# Patient Record
Sex: MALE | Race: WHITE | Employment: FULL TIME | URBAN - METROPOLITAN AREA
[De-identification: names, ages, dates, MRNs, and addresses within clinical notes are randomized per-mention and may not be internally consistent; named-entity substitution may affect disease eponyms.]

---

## 2017-02-24 ENCOUNTER — OFFICE VISIT (OUTPATIENT)
Dept: FAMILY MEDICINE CLINIC | Age: 37
End: 2017-02-24

## 2017-02-24 VITALS
DIASTOLIC BLOOD PRESSURE: 80 MMHG | BODY MASS INDEX: 40.73 KG/M2 | OXYGEN SATURATION: 98 % | WEIGHT: 275 LBS | HEIGHT: 69 IN | SYSTOLIC BLOOD PRESSURE: 126 MMHG | HEART RATE: 66 BPM

## 2017-02-24 DIAGNOSIS — R73.01 ELEVATED FASTING GLUCOSE: ICD-10-CM

## 2017-02-24 DIAGNOSIS — E55.9 VITAMIN D INSUFFICIENCY: ICD-10-CM

## 2017-02-24 DIAGNOSIS — F41.9 ANXIETY: Primary | ICD-10-CM

## 2017-02-24 DIAGNOSIS — E78.1 HYPERTRIGLYCERIDEMIA: ICD-10-CM

## 2017-02-24 PROCEDURE — 99214 OFFICE O/P EST MOD 30 MIN: CPT | Performed by: FAMILY MEDICINE

## 2017-02-24 PROCEDURE — 36415 COLL VENOUS BLD VENIPUNCTURE: CPT | Performed by: FAMILY MEDICINE

## 2017-02-24 RX ORDER — ESCITALOPRAM OXALATE 10 MG/1
10 TABLET ORAL DAILY
Qty: 30 TABLET | Refills: 3 | Status: SHIPPED | OUTPATIENT
Start: 2017-02-24 | End: 2017-06-23 | Stop reason: SDUPTHER

## 2017-02-24 RX ORDER — FLUOXETINE HYDROCHLORIDE 40 MG/1
CAPSULE ORAL
Qty: 30 CAPSULE | Refills: 0 | Status: CANCELLED | OUTPATIENT
Start: 2017-02-24

## 2017-02-27 LAB
A/G RATIO: 1.7 (ref 1.1–2.2)
ALBUMIN SERPL-MCNC: 4.3 G/DL (ref 3.4–5)
ALP BLD-CCNC: 55 U/L (ref 40–129)
ALT SERPL-CCNC: 33 U/L (ref 10–40)
ANION GAP SERPL CALCULATED.3IONS-SCNC: 15 MMOL/L (ref 3–16)
AST SERPL-CCNC: 20 U/L (ref 15–37)
BILIRUB SERPL-MCNC: 0.8 MG/DL (ref 0–1)
BUN BLDV-MCNC: 14 MG/DL (ref 7–20)
CALCIUM SERPL-MCNC: 9.2 MG/DL (ref 8.3–10.6)
CHLORIDE BLD-SCNC: 100 MMOL/L (ref 99–110)
CHOLESTEROL, TOTAL: 160 MG/DL (ref 0–199)
CO2: 23 MMOL/L (ref 21–32)
CREAT SERPL-MCNC: 0.7 MG/DL (ref 0.9–1.3)
GFR AFRICAN AMERICAN: >60
GFR NON-AFRICAN AMERICAN: >60
GLOBULIN: 2.5 G/DL
GLUCOSE BLD-MCNC: 105 MG/DL (ref 70–99)
HDLC SERPL-MCNC: 27 MG/DL (ref 40–60)
LDL CHOLESTEROL CALCULATED: 78 MG/DL
POTASSIUM SERPL-SCNC: 4.3 MMOL/L (ref 3.5–5.1)
SODIUM BLD-SCNC: 138 MMOL/L (ref 136–145)
TOTAL PROTEIN: 6.8 G/DL (ref 6.4–8.2)
TRIGL SERPL-MCNC: 273 MG/DL (ref 0–150)
TSH REFLEX: 3.3 UIU/ML (ref 0.27–4.2)
VITAMIN D 25-HYDROXY: 33 NG/ML
VLDLC SERPL CALC-MCNC: 55 MG/DL

## 2017-02-28 LAB
ESTIMATED AVERAGE GLUCOSE: 91.1 MG/DL
HBA1C MFR BLD: 4.8 %

## 2017-06-23 RX ORDER — ESCITALOPRAM OXALATE 10 MG/1
TABLET ORAL
Qty: 30 TABLET | Refills: 3 | Status: SHIPPED | OUTPATIENT
Start: 2017-06-23 | End: 2017-11-02

## 2017-08-01 ENCOUNTER — OFFICE VISIT (OUTPATIENT)
Dept: FAMILY MEDICINE CLINIC | Age: 37
End: 2017-08-01

## 2017-08-01 VITALS
SYSTOLIC BLOOD PRESSURE: 130 MMHG | HEART RATE: 72 BPM | BODY MASS INDEX: 41.66 KG/M2 | OXYGEN SATURATION: 98 % | WEIGHT: 291 LBS | HEIGHT: 70 IN | DIASTOLIC BLOOD PRESSURE: 82 MMHG

## 2017-08-01 DIAGNOSIS — F41.9 ANXIETY: ICD-10-CM

## 2017-08-01 PROCEDURE — 99213 OFFICE O/P EST LOW 20 MIN: CPT | Performed by: NURSE PRACTITIONER

## 2017-08-01 RX ORDER — FLUOXETINE 20 MG/1
20 TABLET, FILM COATED ORAL DAILY
Qty: 30 TABLET | Refills: 3 | Status: SHIPPED | OUTPATIENT
Start: 2017-08-01 | End: 2017-11-02

## 2017-11-02 ENCOUNTER — OFFICE VISIT (OUTPATIENT)
Dept: FAMILY MEDICINE CLINIC | Age: 37
End: 2017-11-02

## 2017-11-02 VITALS
WEIGHT: 280 LBS | SYSTOLIC BLOOD PRESSURE: 120 MMHG | HEART RATE: 64 BPM | BODY MASS INDEX: 40.18 KG/M2 | OXYGEN SATURATION: 97 % | DIASTOLIC BLOOD PRESSURE: 80 MMHG

## 2017-11-02 DIAGNOSIS — H61.23 BILATERAL IMPACTED CERUMEN: Primary | ICD-10-CM

## 2017-11-02 DIAGNOSIS — H65.192 OTHER ACUTE NONSUPPURATIVE OTITIS MEDIA OF LEFT EAR, RECURRENCE NOT SPECIFIED: ICD-10-CM

## 2017-11-02 PROCEDURE — 99213 OFFICE O/P EST LOW 20 MIN: CPT | Performed by: FAMILY MEDICINE

## 2017-11-02 RX ORDER — OFLOXACIN 3 MG/ML
5 SOLUTION AURICULAR (OTIC) 2 TIMES DAILY
Qty: 10 ML | Refills: 0 | Status: SHIPPED | OUTPATIENT
Start: 2017-11-02 | End: 2017-11-12

## 2017-11-02 ASSESSMENT — PATIENT HEALTH QUESTIONNAIRE - PHQ9
2. FEELING DOWN, DEPRESSED OR HOPELESS: 0
SUM OF ALL RESPONSES TO PHQ QUESTIONS 1-9: 0
SUM OF ALL RESPONSES TO PHQ9 QUESTIONS 1 & 2: 0
1. LITTLE INTEREST OR PLEASURE IN DOING THINGS: 0

## 2017-11-07 NOTE — PROGRESS NOTES
Subjective:      Tony Walden is a 40 y.o. male who presents for evaluation of a plugged ear. He noticed the symptoms in the left ear, 6 weeks ago. Kev Boothe admits to ear pain. Patient's medications, allergies, past medical, surgical, social and family histories were reviewed and updated as appropriate. Review of Systems  Constitutional: negative for fevers      Objective:      /80 (Site: Right Arm, Position: Sitting, Cuff Size: Large Adult)   Pulse 64   Wt 280 lb (127 kg)   SpO2 97%   BMI 40.18 kg/m²   General: alert, appears stated age, cooperative and no distress   Right Ear: right canal ceruminous: cleaned with curette and ceruminous: irrigated   Left Ear:  left canal ceruminous: cleaned with curette, ceruminous: irrigated, red and inflamed   After removal: external auditory canal with inflammation/exudate on the left         Assessment:      Cerumen Impaction, with otitis externa. Plan:      Cerumen removed by flushing and currettage. Care instructions given. Home treatment: Floxin Otic. Follow up as needed.

## 2019-06-18 ENCOUNTER — OFFICE VISIT (OUTPATIENT)
Dept: FAMILY MEDICINE CLINIC | Age: 39
End: 2019-06-18
Payer: COMMERCIAL

## 2019-06-18 VITALS
BODY MASS INDEX: 41.52 KG/M2 | DIASTOLIC BLOOD PRESSURE: 80 MMHG | HEART RATE: 86 BPM | SYSTOLIC BLOOD PRESSURE: 132 MMHG | HEIGHT: 70 IN | WEIGHT: 290 LBS | OXYGEN SATURATION: 98 %

## 2019-06-18 DIAGNOSIS — Z13.31 POSITIVE DEPRESSION SCREENING: ICD-10-CM

## 2019-06-18 DIAGNOSIS — R00.2 PALPITATIONS: Primary | ICD-10-CM

## 2019-06-18 DIAGNOSIS — R06.83 SNORING: ICD-10-CM

## 2019-06-18 DIAGNOSIS — R53.83 FATIGUE, UNSPECIFIED TYPE: ICD-10-CM

## 2019-06-18 LAB
A/G RATIO: 1.9 (ref 1.1–2.2)
ALBUMIN SERPL-MCNC: 4.6 G/DL (ref 3.4–5)
ALP BLD-CCNC: 56 U/L (ref 40–129)
ALT SERPL-CCNC: 28 U/L (ref 10–40)
ANION GAP SERPL CALCULATED.3IONS-SCNC: 12 MMOL/L (ref 3–16)
AST SERPL-CCNC: 19 U/L (ref 15–37)
BASOPHILS ABSOLUTE: 0 K/UL (ref 0–0.2)
BASOPHILS RELATIVE PERCENT: 0.6 %
BILIRUB SERPL-MCNC: 0.7 MG/DL (ref 0–1)
BUN BLDV-MCNC: 17 MG/DL (ref 7–20)
CALCIUM SERPL-MCNC: 9.4 MG/DL (ref 8.3–10.6)
CHLORIDE BLD-SCNC: 107 MMOL/L (ref 99–110)
CHOLESTEROL, TOTAL: 155 MG/DL (ref 0–199)
CO2: 24 MMOL/L (ref 21–32)
CREAT SERPL-MCNC: 1 MG/DL (ref 0.9–1.3)
EOSINOPHILS ABSOLUTE: 0.2 K/UL (ref 0–0.6)
EOSINOPHILS RELATIVE PERCENT: 3.3 %
GFR AFRICAN AMERICAN: >60
GFR NON-AFRICAN AMERICAN: >60
GLOBULIN: 2.4 G/DL
GLUCOSE BLD-MCNC: 118 MG/DL (ref 70–99)
HCT VFR BLD CALC: 44.1 % (ref 40.5–52.5)
HDLC SERPL-MCNC: 25 MG/DL (ref 40–60)
HEMOGLOBIN: 15 G/DL (ref 13.5–17.5)
LDL CHOLESTEROL CALCULATED: ABNORMAL MG/DL
LDL CHOLESTEROL DIRECT: 78 MG/DL
LYMPHOCYTES ABSOLUTE: 1.6 K/UL (ref 1–5.1)
LYMPHOCYTES RELATIVE PERCENT: 24 %
MCH RBC QN AUTO: 30.3 PG (ref 26–34)
MCHC RBC AUTO-ENTMCNC: 34 G/DL (ref 31–36)
MCV RBC AUTO: 89.2 FL (ref 80–100)
MONOCYTES ABSOLUTE: 0.6 K/UL (ref 0–1.3)
MONOCYTES RELATIVE PERCENT: 9.1 %
NEUTROPHILS ABSOLUTE: 4.1 K/UL (ref 1.7–7.7)
NEUTROPHILS RELATIVE PERCENT: 63 %
PDW BLD-RTO: 13 % (ref 12.4–15.4)
PLATELET # BLD: 175 K/UL (ref 135–450)
PMV BLD AUTO: 8.9 FL (ref 5–10.5)
POTASSIUM SERPL-SCNC: 4.4 MMOL/L (ref 3.5–5.1)
RBC # BLD: 4.95 M/UL (ref 4.2–5.9)
SODIUM BLD-SCNC: 143 MMOL/L (ref 136–145)
T4 FREE: 1.2 NG/DL (ref 0.9–1.8)
TOTAL PROTEIN: 7 G/DL (ref 6.4–8.2)
TRIGL SERPL-MCNC: 353 MG/DL (ref 0–150)
TSH REFLEX: 5.13 UIU/ML (ref 0.27–4.2)
VITAMIN B-12: 680 PG/ML (ref 211–911)
VITAMIN D 25-HYDROXY: 29.5 NG/ML
VLDLC SERPL CALC-MCNC: ABNORMAL MG/DL
WBC # BLD: 6.5 K/UL (ref 4–11)

## 2019-06-18 PROCEDURE — 99214 OFFICE O/P EST MOD 30 MIN: CPT | Performed by: FAMILY MEDICINE

## 2019-06-18 PROCEDURE — G0444 DEPRESSION SCREEN ANNUAL: HCPCS | Performed by: FAMILY MEDICINE

## 2019-06-18 PROCEDURE — G8431 POS CLIN DEPRES SCRN F/U DOC: HCPCS | Performed by: FAMILY MEDICINE

## 2019-06-18 PROCEDURE — 93000 ELECTROCARDIOGRAM COMPLETE: CPT | Performed by: FAMILY MEDICINE

## 2019-06-18 ASSESSMENT — ENCOUNTER SYMPTOMS
SHORTNESS OF BREATH: 0
NAUSEA: 0

## 2019-06-18 ASSESSMENT — PATIENT HEALTH QUESTIONNAIRE - PHQ9
8. MOVING OR SPEAKING SO SLOWLY THAT OTHER PEOPLE COULD HAVE NOTICED. OR THE OPPOSITE, BEING SO FIGETY OR RESTLESS THAT YOU HAVE BEEN MOVING AROUND A LOT MORE THAN USUAL: 0
4. FEELING TIRED OR HAVING LITTLE ENERGY: 3
5. POOR APPETITE OR OVEREATING: 2
6. FEELING BAD ABOUT YOURSELF - OR THAT YOU ARE A FAILURE OR HAVE LET YOURSELF OR YOUR FAMILY DOWN: 3
SUM OF ALL RESPONSES TO PHQ QUESTIONS 1-9: 13
2. FEELING DOWN, DEPRESSED OR HOPELESS: 2
SUM OF ALL RESPONSES TO PHQ9 QUESTIONS 1 & 2: 2
9. THOUGHTS THAT YOU WOULD BE BETTER OFF DEAD, OR OF HURTING YOURSELF: 0
1. LITTLE INTEREST OR PLEASURE IN DOING THINGS: 0
10. IF YOU CHECKED OFF ANY PROBLEMS, HOW DIFFICULT HAVE THESE PROBLEMS MADE IT FOR YOU TO DO YOUR WORK, TAKE CARE OF THINGS AT HOME, OR GET ALONG WITH OTHER PEOPLE: 1
3. TROUBLE FALLING OR STAYING ASLEEP: 3
7. TROUBLE CONCENTRATING ON THINGS, SUCH AS READING THE NEWSPAPER OR WATCHING TELEVISION: 0
SUM OF ALL RESPONSES TO PHQ QUESTIONS 1-9: 13

## 2019-06-18 NOTE — PROGRESS NOTES
2019     Sharmin Leonardo (:  1980) is a 44 y.o. male, here for evaluation of the following medical concerns:    Chief complaint: Patient presents with:  Fatigue  Tachycardia: rapid heart rate after eating sugar  Extremity Weakness     Past medical, surgical, family and social history, as well as current medications and allergies, were reviewed and updated with the patient. Fatigue   This is a new problem. The current episode started more than 1 month ago (x 3 months, just not feeling like himself). The problem occurs constantly. The problem has been rapidly worsening. Associated symptoms include fatigue and weakness (feels like muscles are weak all over, feels like they get tired easily). Pertinent negatives include no chest pain or nausea. The symptoms are aggravated by exertion. He has tried rest for the symptoms. The treatment provided no relief. Palpitations    This is a new problem. Episode onset: x 3 months. The problem occurs intermittently. The problem has been unchanged. Associated symptoms include anxiety, an irregular heartbeat, malaise/fatigue and weakness (feels like muscles are weak all over, feels like they get tired easily). Pertinent negatives include no chest fullness, chest pain, dizziness, nausea, near-syncope or shortness of breath. Risk factors include obesity. Patient complains of a 20 year(s) history of non-restful sleep. Associated symptoms include: daytime fatigue, snoring, witnessed apneic episodes, anhedonia and depressed mood. Prior history of similar symptoms: yes - has never had a work up. He has a history of  depression and anxiety disorder. Patient has not attempted self treatment. .      Review of Systems   Constitutional: Positive for fatigue and malaise/fatigue. Respiratory: Negative for shortness of breath. Cardiovascular: Positive for palpitations. Negative for chest pain and near-syncope. Gastrointestinal: Negative for nausea.    Neurological: Positive for weakness (feels like muscles are weak all over, feels like they get tired easily). Negative for dizziness. Psychiatric/Behavioral: The patient is nervous/anxious. Prior to Visit Medications    Not on File        Social History     Tobacco Use    Smoking status: Never Smoker    Smokeless tobacco: Never Used   Substance Use Topics    Alcohol use: Yes     Comment: emil        Vitals:    06/18/19 0804   BP: 132/80   Site: Right Upper Arm   Position: Sitting   Cuff Size: Large Adult   Pulse: 86   SpO2: 98%   Weight: 290 lb (131.5 kg)   Height: 5' 10\" (1.778 m)     Estimated body mass index is 41.61 kg/m² as calculated from the following:    Height as of this encounter: 5' 10\" (1.778 m). Weight as of this encounter: 290 lb (131.5 kg). Physical Exam   Constitutional: He appears well-developed and well-nourished. No distress. HENT:   Head: Normocephalic and atraumatic. Eyes: Conjunctivae are normal. No scleral icterus. Cardiovascular: Normal rate, regular rhythm and normal heart sounds. Exam reveals no gallop and no friction rub. No murmur heard. Pulmonary/Chest: Effort normal and breath sounds normal. No respiratory distress. He has no wheezes. He has no rales. Abdominal: Soft. Bowel sounds are normal. He exhibits no distension and no mass. There is no tenderness. There is no rebound and no guarding. Musculoskeletal: He exhibits no edema. Neurological: He is alert. Skin: He is not diaphoretic. Nursing note and vitals reviewed. ASSESSMENT/PLAN:  1. Palpitations  - EKG 12 Lead  - CBC Auto Differential  - Comprehensive Metabolic Panel  - TSH with Reflex  - Lipid Panel  - Hemoglobin A1C  - Angelica Obrien MD, Sleep Medicine, [facility]    2. Fatigue, unspecified type  Discussed diagnosis with patient. Reassured that serious underlying cause for the fatigue is very unlikely. Discussed lifestyle modification as means of resolving problem.   Discussed how depression can be a cause of fatigue.     - EKG 12 Lead  - CBC Auto Differential  - Comprehensive Metabolic Panel  - TSH with Reflex  - Lipid Panel  - Hemoglobin A1C  - Vitamin D 25 Hydroxy  - Vitamin B12  - [facility]    3. Snoring  Likely needs PSG.   - Adelaida Schaumann, [facility], [facility]      No follow-ups on file. An electronic signature was used to authenticate this note. --Bethanie Reeder MD on 6/18/2019 at 1:33 PM  On the basis of positive PHQ-9 screening (PHQ-9 Total Score: 13), the following plan was implemented: medication recommended.

## 2019-06-19 LAB
ESTIMATED AVERAGE GLUCOSE: 102.5 MG/DL
HBA1C MFR BLD: 5.2 %

## 2019-06-23 DIAGNOSIS — R79.89 ELEVATED TSH: Primary | ICD-10-CM

## 2019-11-12 ENCOUNTER — OFFICE VISIT (OUTPATIENT)
Dept: PULMONOLOGY | Age: 39
End: 2019-11-12
Payer: COMMERCIAL

## 2019-11-12 VITALS
TEMPERATURE: 98.7 F | DIASTOLIC BLOOD PRESSURE: 88 MMHG | RESPIRATION RATE: 20 BRPM | HEIGHT: 70 IN | BODY MASS INDEX: 43.75 KG/M2 | WEIGHT: 305.6 LBS | OXYGEN SATURATION: 95 % | SYSTOLIC BLOOD PRESSURE: 139 MMHG | HEART RATE: 79 BPM

## 2019-11-12 DIAGNOSIS — G47.10 EXCESSIVE SOMNOLENCE DISORDER: Primary | ICD-10-CM

## 2019-11-12 PROCEDURE — 99243 OFF/OP CNSLTJ NEW/EST LOW 30: CPT | Performed by: INTERNAL MEDICINE

## 2019-11-12 ASSESSMENT — SLEEP AND FATIGUE QUESTIONNAIRES
HOW LIKELY ARE YOU TO NOD OFF OR FALL ASLEEP WHILE SITTING INACTIVE IN A PUBLIC PLACE: 1
HOW LIKELY ARE YOU TO NOD OFF OR FALL ASLEEP WHILE SITTING AND READING: 2
HOW LIKELY ARE YOU TO NOD OFF OR FALL ASLEEP WHILE SITTING QUIETLY AFTER LUNCH WITHOUT ALCOHOL: 1
HOW LIKELY ARE YOU TO NOD OFF OR FALL ASLEEP WHILE LYING DOWN TO REST IN THE AFTERNOON WHEN CIRCUMSTANCES PERMIT: 3
DO YOU HAVE CONGESTIVE HEART FAILURE (CHF), HYPERTENSION, CORONARY DISEASE, CARDIAC ARRYTHMIAS, DIABETES, OR HAD A STROKE: 0
HOW LIKELY ARE YOU TO NOD OFF OR FALL ASLEEP IN A CAR, WHILE STOPPED FOR A FEW MINUTES IN TRAFFIC: 0
HOW LIKELY ARE YOU TO NOD OFF OR FALL ASLEEP WHILE SITTING AND TALKING TO SOMEONE: 0
HAVE YOU BEEN TOLD YOU SNORE, EVEN INTERMITTENTLY: 1
HOW LIKELY ARE YOU TO NOD OFF OR FALL ASLEEP WHEN YOU ARE A PASSENGER IN A CAR FOR AN HOUR WITHOUT A BREAK: 2
HAVE YOU EVER BEEN TOLD YOU STOP BREATHING OR HOLD YOUR BREATH WHILE SLEEPING: 3
DO YOU WAKE UP MORE THAN ONCE A NIGHT TO URINATE: 0
ESS TOTAL SCORE: 10
HOW LIKELY ARE YOU TO NOD OFF OR FALL ASLEEP WHILE WATCHING TV: 1
DO YOU WAKE UP TIRED, ARE YOU TIRED DURING THE DAY, OR DO YOU TAKE NAPS: 1
NECK CIRCUMFERENCE (INCHES): 16
SLEEP APNEA SCREENER SCORE: 6

## 2019-11-12 ASSESSMENT — ENCOUNTER SYMPTOMS
EYE DISCHARGE: 0
EYE PAIN: 0
CONSTIPATION: 0
EYE ITCHING: 0
CHOKING: 0
STRIDOR: 0
DIARRHEA: 0
ABDOMINAL PAIN: 0
VOICE CHANGE: 0
SORE THROAT: 0
CHEST TIGHTNESS: 0

## 2020-01-06 ENCOUNTER — HOSPITAL ENCOUNTER (OUTPATIENT)
Dept: SLEEP CENTER | Age: 40
Discharge: HOME | End: 2020-01-08
Payer: COMMERCIAL

## 2020-01-06 PROCEDURE — 95806 SLEEP STUDY UNATT&RESP EFFT: CPT

## 2020-01-09 ENCOUNTER — OFFICE VISIT (OUTPATIENT)
Dept: PULMONOLOGY | Age: 40
End: 2020-01-09
Payer: COMMERCIAL

## 2020-01-09 VITALS
TEMPERATURE: 98.4 F | BODY MASS INDEX: 43.03 KG/M2 | DIASTOLIC BLOOD PRESSURE: 92 MMHG | HEART RATE: 59 BPM | HEIGHT: 70 IN | SYSTOLIC BLOOD PRESSURE: 155 MMHG | WEIGHT: 300.6 LBS | OXYGEN SATURATION: 99 % | RESPIRATION RATE: 18 BRPM

## 2020-01-09 PROCEDURE — 99214 OFFICE O/P EST MOD 30 MIN: CPT | Performed by: INTERNAL MEDICINE

## 2020-01-09 ASSESSMENT — ENCOUNTER SYMPTOMS
CONSTIPATION: 0
CHEST TIGHTNESS: 0
STRIDOR: 0
EYE DISCHARGE: 0
CHOKING: 0
SORE THROAT: 0
DIARRHEA: 0
VOICE CHANGE: 0
ABDOMINAL PAIN: 0
EYE ITCHING: 0
EYE PAIN: 0

## 2020-01-09 NOTE — PATIENT INSTRUCTIONS
Remember to bring all pulmonary medications to your next appointment with the office. Please keep all of your future appointments scheduled by [address], [address] [facility] office. Out of respect for other patients and providers, you may be asked to reschedule your appointment if you arrive later than your scheduled appointment time. Appointments cancelled less than 24hrs in advance will be considered a no show. Patients with three missed appointments within 1 year or four missed appointments within 2 years can be dismissed from the practice. You may receive a survey regarding the care you received during your visit. Your input is valuable to us. We encourage you to complete and return your survey. We hope you will choose us in the future for your healthcare needs.

## 2020-01-09 NOTE — PROGRESS NOTES
Pulmonary Outpatient Note   Shanice Toscano MD       1/9/2020    Chief Complaint:  Follow-up (HST)     HPI:   44y.o. year old male here for follow up of EDDA. AHI of 30 on sleep study confirming severe EDDA  Unchanged symptoms from last visit     Past Medical History:   Diagnosis Date    Anxiety     Irritable bowel syndrome     Metabolic syndrome        Past Surgical History:   Procedure Laterality Date    APPENDECTOMY  1989       Social History     Tobacco Use    Smoking status: Never Smoker    Smokeless tobacco: Never Used   Substance Use Topics    Alcohol use: Yes     Comment: socially          Family History   Problem Relation Age of Onset    Cancer Maternal Grandmother         colon    Heart Disease Paternal Grandfather        No current outpatient medications on file. Patient has no known allergies. Vitals:    01/09/20 0833   BP: (!) 155/92   Pulse: 59   Resp: 18   Temp: 98.4 °F (36.9 °C)   TempSrc: Oral   SpO2: 99%   Weight: (!) 300 lb 9.6 oz (136.4 kg)   Height: 5' 10\" (1.778 m)       Review of Systems   Constitutional: Negative for chills, fever and unexpected weight change. HENT: Negative for mouth sores, sore throat and voice change. Eyes: Negative for pain, discharge and itching. Respiratory: Negative for choking, chest tightness and stridor. Cardiovascular: Negative for chest pain, palpitations and leg swelling. Gastrointestinal: Negative for abdominal pain, constipation and diarrhea. Endocrine: Negative for cold intolerance, heat intolerance and polydipsia. Genitourinary: Negative for dysuria, frequency and hematuria. Musculoskeletal: Negative for gait problem, joint swelling and neck stiffness. Neurological: Negative for dizziness, numbness and headaches. Psychiatric/Behavioral: Negative for agitation, confusion and hallucinations. Physical Exam  Constitutional:       General: He is not in acute distress. Appearance: He is well-developed.  He is not diaphoretic. HENT:      Head: Normocephalic and atraumatic. Mouth/Throat:      Pharynx: No oropharyngeal exudate. Eyes:      Pupils: Pupils are equal, round, and reactive to light. Neck:      Musculoskeletal: Neck supple. Vascular: No JVD. Cardiovascular:      Heart sounds: Normal heart sounds. No murmur. No friction rub. No gallop. Pulmonary:      Effort: Pulmonary effort is normal.      Breath sounds: No wheezing or rales. Abdominal:      General: Bowel sounds are normal. There is no distension. Palpations: Abdomen is soft. Tenderness: There is no tenderness. Musculoskeletal: Normal range of motion. Lymphadenopathy:      Cervical: No cervical adenopathy. Skin:     General: Skin is warm and dry. Findings: No rash. Neurological:      Mental Status: He is alert. Cranial Nerves: No cranial nerve deficit.       Comments: CN 2-12 grossly intact         ASSESSMENT:    1. EDDA (obstructive sleep apnea)      PLAN:    -Discussed new diagnosis of severe EDDA and treatment options  -He agreed to proceed with PAP therapy, will set up with this along with mask fitting through DME  -Counseled on sleep hygiene and weight management  -Return to clinic in 31 days     Orders Placed This Encounter   Procedures    CPAP    CPAP    CPAP    CPAP    CPAP    CPAP continuous - nasal mask    CPAP       Moreno Verma MD

## 2020-03-31 ENCOUNTER — VIRTUAL VISIT (OUTPATIENT)
Dept: PULMONOLOGY | Age: 40
End: 2020-03-31
Payer: COMMERCIAL

## 2020-03-31 ENCOUNTER — TELEPHONE (OUTPATIENT)
Dept: PULMONOLOGY | Age: 40
End: 2020-03-31

## 2020-03-31 PROCEDURE — 99213 OFFICE O/P EST LOW 20 MIN: CPT | Performed by: INTERNAL MEDICINE

## 2020-03-31 ASSESSMENT — ENCOUNTER SYMPTOMS
STRIDOR: 0
CHOKING: 0
SORE THROAT: 0
CHEST TIGHTNESS: 0
EYE DISCHARGE: 0
CONSTIPATION: 0
DIARRHEA: 0
VOICE CHANGE: 0
EYE PAIN: 0
ABDOMINAL PAIN: 0
EYE ITCHING: 0

## 2020-03-31 NOTE — Clinical Note
Needs follow up for EDDA in 6 months. His machine is changed to pressure of 7-18, I placed order to notify DME.  Nothing needs to be done

## 2020-04-08 NOTE — TELEPHONE ENCOUNTER
Patient has not returned multiple calls to schedule a follow up appointment in 6 months. We will wait for patient to contact our office.

## 2021-06-17 ENCOUNTER — OFFICE VISIT (OUTPATIENT)
Dept: FAMILY MEDICINE CLINIC | Age: 41
End: 2021-06-17
Payer: COMMERCIAL

## 2021-06-17 VITALS
WEIGHT: 284.2 LBS | HEIGHT: 70 IN | DIASTOLIC BLOOD PRESSURE: 80 MMHG | OXYGEN SATURATION: 97 % | HEART RATE: 55 BPM | BODY MASS INDEX: 40.69 KG/M2 | SYSTOLIC BLOOD PRESSURE: 126 MMHG

## 2021-06-17 DIAGNOSIS — E88.81 METABOLIC SYNDROME: ICD-10-CM

## 2021-06-17 DIAGNOSIS — E78.2 MIXED HYPERLIPIDEMIA: ICD-10-CM

## 2021-06-17 DIAGNOSIS — Z11.59 NEED FOR HEPATITIS C SCREENING TEST: ICD-10-CM

## 2021-06-17 DIAGNOSIS — R43.2 LOSS OF TASTE: ICD-10-CM

## 2021-06-17 DIAGNOSIS — R43.0 LOSS OF SMELL: ICD-10-CM

## 2021-06-17 DIAGNOSIS — N52.9 ERECTILE DYSFUNCTION, UNSPECIFIED ERECTILE DYSFUNCTION TYPE: ICD-10-CM

## 2021-06-17 DIAGNOSIS — E55.9 VITAMIN D INSUFFICIENCY: ICD-10-CM

## 2021-06-17 DIAGNOSIS — R79.89 ABNORMAL TSH: Primary | ICD-10-CM

## 2021-06-17 DIAGNOSIS — E66.01 MORBID OBESITY WITH BMI OF 40.0-44.9, ADULT (HCC): ICD-10-CM

## 2021-06-17 LAB
A/G RATIO: 2 (ref 1.1–2.2)
ALBUMIN SERPL-MCNC: 4.8 G/DL (ref 3.4–5)
ALP BLD-CCNC: 64 U/L (ref 40–129)
ALT SERPL-CCNC: 29 U/L (ref 10–40)
ANION GAP SERPL CALCULATED.3IONS-SCNC: 12 MMOL/L (ref 3–16)
AST SERPL-CCNC: 23 U/L (ref 15–37)
BASOPHILS ABSOLUTE: 0 K/UL (ref 0–0.2)
BASOPHILS RELATIVE PERCENT: 0.3 %
BILIRUB SERPL-MCNC: 1.4 MG/DL (ref 0–1)
BUN BLDV-MCNC: 18 MG/DL (ref 7–20)
CALCIUM SERPL-MCNC: 9.7 MG/DL (ref 8.3–10.6)
CHLORIDE BLD-SCNC: 103 MMOL/L (ref 99–110)
CHOLESTEROL, TOTAL: 165 MG/DL (ref 0–199)
CO2: 25 MMOL/L (ref 21–32)
CREAT SERPL-MCNC: 1.1 MG/DL (ref 0.9–1.3)
EOSINOPHILS ABSOLUTE: 0.1 K/UL (ref 0–0.6)
EOSINOPHILS RELATIVE PERCENT: 1.4 %
GFR AFRICAN AMERICAN: >60
GFR NON-AFRICAN AMERICAN: >60
GLOBULIN: 2.4 G/DL
GLUCOSE BLD-MCNC: 87 MG/DL (ref 70–99)
HCT VFR BLD CALC: 44.1 % (ref 40.5–52.5)
HDLC SERPL-MCNC: 29 MG/DL (ref 40–60)
HEMOGLOBIN: 15.4 G/DL (ref 13.5–17.5)
HEPATITIS C ANTIBODY INTERPRETATION: NORMAL
LDL CHOLESTEROL CALCULATED: 112 MG/DL
LYMPHOCYTES ABSOLUTE: 1.4 K/UL (ref 1–5.1)
LYMPHOCYTES RELATIVE PERCENT: 26.1 %
MCH RBC QN AUTO: 30.5 PG (ref 26–34)
MCHC RBC AUTO-ENTMCNC: 34.9 G/DL (ref 31–36)
MCV RBC AUTO: 87.3 FL (ref 80–100)
MONOCYTES ABSOLUTE: 0.5 K/UL (ref 0–1.3)
MONOCYTES RELATIVE PERCENT: 8.8 %
NEUTROPHILS ABSOLUTE: 3.5 K/UL (ref 1.7–7.7)
NEUTROPHILS RELATIVE PERCENT: 63.4 %
PDW BLD-RTO: 12.9 % (ref 12.4–15.4)
PLATELET # BLD: 155 K/UL (ref 135–450)
PMV BLD AUTO: 9 FL (ref 5–10.5)
POTASSIUM SERPL-SCNC: 4.1 MMOL/L (ref 3.5–5.1)
RBC # BLD: 5.05 M/UL (ref 4.2–5.9)
SARS-COV-2 ANTIBODY, TOTAL: POSITIVE
SODIUM BLD-SCNC: 140 MMOL/L (ref 136–145)
T4 FREE: 1.3 NG/DL (ref 0.9–1.8)
TOTAL PROTEIN: 7.2 G/DL (ref 6.4–8.2)
TRIGL SERPL-MCNC: 121 MG/DL (ref 0–150)
TSH REFLEX: 3.68 UIU/ML (ref 0.27–4.2)
VITAMIN D 25-HYDROXY: 31.1 NG/ML
VLDLC SERPL CALC-MCNC: 24 MG/DL
WBC # BLD: 5.5 K/UL (ref 4–11)

## 2021-06-17 PROCEDURE — 99214 OFFICE O/P EST MOD 30 MIN: CPT | Performed by: FAMILY MEDICINE

## 2021-06-17 SDOH — ECONOMIC STABILITY: INCOME INSECURITY: IN THE LAST 12 MONTHS, WAS THERE A TIME WHEN YOU WERE NOT ABLE TO PAY THE MORTGAGE OR RENT ON TIME?: NO

## 2021-06-17 SDOH — ECONOMIC STABILITY: FOOD INSECURITY: WITHIN THE PAST 12 MONTHS, YOU WORRIED THAT YOUR FOOD WOULD RUN OUT BEFORE YOU GOT MONEY TO BUY MORE.: NEVER TRUE

## 2021-06-17 SDOH — ECONOMIC STABILITY: FOOD INSECURITY: WITHIN THE PAST 12 MONTHS, THE FOOD YOU BOUGHT JUST DIDN'T LAST AND YOU DIDN'T HAVE MONEY TO GET MORE.: NEVER TRUE

## 2021-06-17 SDOH — ECONOMIC STABILITY: TRANSPORTATION INSECURITY
IN THE PAST 12 MONTHS, HAS THE LACK OF TRANSPORTATION KEPT YOU FROM MEDICAL APPOINTMENTS OR FROM GETTING MEDICATIONS?: NO

## 2021-06-17 SDOH — ECONOMIC STABILITY: HOUSING INSECURITY
IN THE LAST 12 MONTHS, WAS THERE A TIME WHEN YOU DID NOT HAVE A STEADY PLACE TO SLEEP OR SLEPT IN A SHELTER (INCLUDING NOW)?: NO

## 2021-06-17 SDOH — ECONOMIC STABILITY: TRANSPORTATION INSECURITY
IN THE PAST 12 MONTHS, HAS LACK OF TRANSPORTATION KEPT YOU FROM MEETINGS, WORK, OR FROM GETTING THINGS NEEDED FOR DAILY LIVING?: NO

## 2021-06-17 ASSESSMENT — PATIENT HEALTH QUESTIONNAIRE - PHQ9
SUM OF ALL RESPONSES TO PHQ9 QUESTIONS 1 & 2: 0
1. LITTLE INTEREST OR PLEASURE IN DOING THINGS: 0
SUM OF ALL RESPONSES TO PHQ QUESTIONS 1-9: 0
SUM OF ALL RESPONSES TO PHQ QUESTIONS 1-9: 0
2. FEELING DOWN, DEPRESSED OR HOPELESS: 0
SUM OF ALL RESPONSES TO PHQ QUESTIONS 1-9: 0

## 2021-06-17 ASSESSMENT — SOCIAL DETERMINANTS OF HEALTH (SDOH): HOW HARD IS IT FOR YOU TO PAY FOR THE VERY BASICS LIKE FOOD, HOUSING, MEDICAL CARE, AND HEATING?: NOT HARD AT ALL

## 2021-06-17 NOTE — PROGRESS NOTES
Gloria Giles (:  1980) is a 39 y.o. male,Established patient, here for evaluation of the following chief complaint(s):  Thyroid Problem (would like thyroid checked. didnt come back after covid started), Discuss Labs (would like testosterone testing ), and Hyperlipidemia         ASSESSMENT/PLAN:  1. Abnormal TSH  -     TSH with Reflex  -     T4, Free  2. Morbid obesity with BMI of 40.0-44.9, adult (HCC)  -     TSH with Reflex  -     T4, Free  -     CBC Auto Differential  -     Comprehensive Metabolic Panel  -     Lipid Panel  -     Hemoglobin A1C  Improving with diet change and exercise. Encouraged to continue. 3. Metabolic syndrome  -     CBC Auto Differential  -     Comprehensive Metabolic Panel  -     Lipid Panel  -     Hemoglobin A1C  4. Mixed hyperlipidemia  -     TSH with Reflex  -     T4, Free  -     CBC Auto Differential  -     Comprehensive Metabolic Panel  -     Lipid Panel  -     Hemoglobin A1C  5. Erectile dysfunction, unspecified erectile dysfunction type  -     Testosterone  6. Loss of taste  -     Covid-19, Antibody, Total  7. Loss of smell  -     Covid-19, Antibody, Total  8. Need for hepatitis C screening test  -     Hepatitis C Antibody  9. Vitamin D insufficiency  -     Vitamin D 25 Hydroxy      Return in about 1 year (around 2022) for Annual physical.         Subjective   SUBJECTIVE/OBJECTIVE:  HPI   Patient is here for follow up on an abnormal TSH test 2 years ago. It was recommended he have the test repeated in 2-4 weeks, but he didn't view the message until a year later, and was unable to come in at that time due to COVID-19 pandemic. Here to follow up on elevated triglycerides and low HDL. He states he has been working on his overall health. He has been counting calories, walking, losing weight. Has had a 3 week plateau. Started running again last week. He would like to have testosterone checked.  He has some intermittent erectile issues, and would like to have this checked. He had an infection with loss of taste and smell. He did not have a COVID test at the time, and would like to know if he has antibodies. Hyperlipidemia:   He is  exercising regularly. OTC Supplements: none. Lab Results   Component Value Date    TRIG 121 06/17/2021    HDL 29 06/17/2021    HDL 26 06/30/2011    LDLCALC 112 06/17/2021    LDLDIRECT 78 06/18/2019     Lab Results   Component Value Date    ALT 29 06/17/2021    AST 23 06/17/2021           Review of Systems       Objective   Physical Exam  Vitals and nursing note reviewed. Constitutional:       General: He is not in acute distress. Appearance: He is well-developed. He is not diaphoretic. HENT:      Head: Normocephalic and atraumatic. Eyes:      General: No scleral icterus. Conjunctiva/sclera: Conjunctivae normal.   Cardiovascular:      Rate and Rhythm: Normal rate and regular rhythm. Heart sounds: Normal heart sounds. No murmur heard. No friction rub. No gallop. Pulmonary:      Effort: Pulmonary effort is normal. No respiratory distress. Breath sounds: Normal breath sounds. No wheezing or rales. Abdominal:      General: Bowel sounds are normal. There is no distension. Palpations: Abdomen is soft. There is no mass. Tenderness: There is no abdominal tenderness. There is no guarding or rebound. Neurological:      Mental Status: He is alert. Lab Review   No visits with results within 6 Month(s) from this visit.    Latest known visit with results is:   Office Visit on 06/18/2019   Component Date Value    WBC 06/18/2019 6.5     RBC 06/18/2019 4.95     Hemoglobin 06/18/2019 15.0     Hematocrit 06/18/2019 44.1     MCV 06/18/2019 89.2     MCH 06/18/2019 30.3     MCHC 06/18/2019 34.0     RDW 06/18/2019 13.0     Platelets 14/89/8479 175     MPV 06/18/2019 8.9     Neutrophils % 06/18/2019 63.0     Lymphocytes % 06/18/2019 24.0     Monocytes % 06/18/2019 9.1     Eosinophils % 06/18/2019 3.3     Basophils % 06/18/2019 0.6     Neutrophils Absolute 06/18/2019 4.1     Lymphocytes Absolute 06/18/2019 1.6     Monocytes Absolute 06/18/2019 0.6     Eosinophils Absolute 06/18/2019 0.2     Basophils Absolute 06/18/2019 0.0     Sodium 06/18/2019 143     Potassium 06/18/2019 4.4     Chloride 06/18/2019 107     CO2 06/18/2019 24     Anion Gap 06/18/2019 12     Glucose 06/18/2019 118*    BUN 06/18/2019 17     CREATININE 06/18/2019 1.0     GFR Non- 06/18/2019 >60     GFR  06/18/2019 >60     Calcium 06/18/2019 9.4     Total Protein 06/18/2019 7.0     Albumin 06/18/2019 4.6     Albumin/Globulin Ratio 06/18/2019 1.9     Total Bilirubin 06/18/2019 0.7     Alkaline Phosphatase 06/18/2019 56     ALT 06/18/2019 28     AST 06/18/2019 19     Globulin 06/18/2019 2.4     TSH 06/18/2019 5.13*    Cholesterol, Total 06/18/2019 155     Triglycerides 06/18/2019 353*    HDL 06/18/2019 25*    LDL Calculated 06/18/2019 see below     VLDL Cholesterol Calcula* 06/18/2019 see below     Hemoglobin A1C 06/18/2019 5.2     eAG 06/18/2019 102.5     Vit D, 25-Hydroxy 06/18/2019 29.5*    Vitamin B-12 06/18/2019 680     LDL Direct 06/18/2019 78     T4 Free 06/18/2019 1.2          An electronic signature was used to authenticate this note.     --Jc Gaspar MD

## 2021-06-18 LAB
ESTIMATED AVERAGE GLUCOSE: 99.7 MG/DL
HBA1C MFR BLD: 5.1 %

## 2021-06-22 LAB — TESTOSTERONE TOTAL: 404 NG/DL (ref 220–1000)

## 2022-06-23 ENCOUNTER — OFFICE VISIT (OUTPATIENT)
Dept: FAMILY MEDICINE CLINIC | Age: 42
End: 2022-06-23
Payer: COMMERCIAL

## 2022-06-23 VITALS
OXYGEN SATURATION: 98 % | HEIGHT: 70 IN | SYSTOLIC BLOOD PRESSURE: 124 MMHG | WEIGHT: 282 LBS | BODY MASS INDEX: 40.37 KG/M2 | HEART RATE: 54 BPM | DIASTOLIC BLOOD PRESSURE: 82 MMHG

## 2022-06-23 DIAGNOSIS — Z00.00 ROUTINE GENERAL MEDICAL EXAMINATION AT A HEALTH CARE FACILITY: Primary | ICD-10-CM

## 2022-06-23 DIAGNOSIS — R41.840 ATTENTION AND CONCENTRATION DEFICIT: ICD-10-CM

## 2022-06-23 DIAGNOSIS — R73.09 ELEVATED GLUCOSE: Primary | ICD-10-CM

## 2022-06-23 DIAGNOSIS — R63.8 UNABLE TO LOSE WEIGHT: ICD-10-CM

## 2022-06-23 PROBLEM — G47.33 OSA ON CPAP: Status: ACTIVE | Noted: 2022-06-23

## 2022-06-23 PROBLEM — Z99.89 OSA ON CPAP: Status: ACTIVE | Noted: 2022-06-23

## 2022-06-23 LAB
A/G RATIO: 1.6 (ref 1.1–2.2)
ALBUMIN SERPL-MCNC: 4.2 G/DL (ref 3.4–5)
ALP BLD-CCNC: 68 U/L (ref 40–129)
ALT SERPL-CCNC: 26 U/L (ref 10–40)
ANION GAP SERPL CALCULATED.3IONS-SCNC: 13 MMOL/L (ref 3–16)
AST SERPL-CCNC: 19 U/L (ref 15–37)
BASOPHILS ABSOLUTE: 0.1 K/UL (ref 0–0.2)
BASOPHILS RELATIVE PERCENT: 1 %
BILIRUB SERPL-MCNC: 0.9 MG/DL (ref 0–1)
BUN BLDV-MCNC: 14 MG/DL (ref 7–20)
CALCIUM SERPL-MCNC: 9.4 MG/DL (ref 8.3–10.6)
CHLORIDE BLD-SCNC: 103 MMOL/L (ref 99–110)
CHOLESTEROL, TOTAL: 162 MG/DL (ref 0–199)
CO2: 23 MMOL/L (ref 21–32)
CORTISOL - AM: 5.6 UG/DL (ref 4.3–22.4)
CREAT SERPL-MCNC: 0.9 MG/DL (ref 0.9–1.3)
EOSINOPHILS ABSOLUTE: 0.1 K/UL (ref 0–0.6)
EOSINOPHILS RELATIVE PERCENT: 2.1 %
GFR AFRICAN AMERICAN: >60
GFR NON-AFRICAN AMERICAN: >60
GLUCOSE BLD-MCNC: 108 MG/DL (ref 70–99)
HCT VFR BLD CALC: 41.4 % (ref 40.5–52.5)
HDLC SERPL-MCNC: 30 MG/DL (ref 40–60)
HEMOGLOBIN: 14.5 G/DL (ref 13.5–17.5)
LDL CHOLESTEROL CALCULATED: 109 MG/DL
LYMPHOCYTES ABSOLUTE: 1.6 K/UL (ref 1–5.1)
LYMPHOCYTES RELATIVE PERCENT: 26.2 %
MCH RBC QN AUTO: 30.2 PG (ref 26–34)
MCHC RBC AUTO-ENTMCNC: 35 G/DL (ref 31–36)
MCV RBC AUTO: 86.3 FL (ref 80–100)
MONOCYTES ABSOLUTE: 0.4 K/UL (ref 0–1.3)
MONOCYTES RELATIVE PERCENT: 7.3 %
NEUTROPHILS ABSOLUTE: 3.9 K/UL (ref 1.7–7.7)
NEUTROPHILS RELATIVE PERCENT: 63.4 %
PDW BLD-RTO: 13.1 % (ref 12.4–15.4)
PLATELET # BLD: 153 K/UL (ref 135–450)
PMV BLD AUTO: 8.9 FL (ref 5–10.5)
POTASSIUM SERPL-SCNC: 4.3 MMOL/L (ref 3.5–5.1)
RBC # BLD: 4.8 M/UL (ref 4.2–5.9)
SODIUM BLD-SCNC: 139 MMOL/L (ref 136–145)
TOTAL PROTEIN: 6.8 G/DL (ref 6.4–8.2)
TRIGL SERPL-MCNC: 113 MG/DL (ref 0–150)
TSH REFLEX: 2.76 UIU/ML (ref 0.27–4.2)
VLDLC SERPL CALC-MCNC: 23 MG/DL
WBC # BLD: 6.1 K/UL (ref 4–11)

## 2022-06-23 PROCEDURE — 99396 PREV VISIT EST AGE 40-64: CPT | Performed by: FAMILY MEDICINE

## 2022-06-23 SDOH — ECONOMIC STABILITY: FOOD INSECURITY: WITHIN THE PAST 12 MONTHS, THE FOOD YOU BOUGHT JUST DIDN'T LAST AND YOU DIDN'T HAVE MONEY TO GET MORE.: NEVER TRUE

## 2022-06-23 SDOH — ECONOMIC STABILITY: FOOD INSECURITY: WITHIN THE PAST 12 MONTHS, YOU WORRIED THAT YOUR FOOD WOULD RUN OUT BEFORE YOU GOT MONEY TO BUY MORE.: NEVER TRUE

## 2022-06-23 ASSESSMENT — PATIENT HEALTH QUESTIONNAIRE - PHQ9
1. LITTLE INTEREST OR PLEASURE IN DOING THINGS: 0
SUM OF ALL RESPONSES TO PHQ9 QUESTIONS 1 & 2: 0
SUM OF ALL RESPONSES TO PHQ QUESTIONS 1-9: 0
2. FEELING DOWN, DEPRESSED OR HOPELESS: 0
SUM OF ALL RESPONSES TO PHQ QUESTIONS 1-9: 0

## 2022-06-23 ASSESSMENT — SOCIAL DETERMINANTS OF HEALTH (SDOH): HOW HARD IS IT FOR YOU TO PAY FOR THE VERY BASICS LIKE FOOD, HOUSING, MEDICAL CARE, AND HEATING?: NOT HARD AT ALL

## 2022-06-24 DIAGNOSIS — R73.09 ELEVATED GLUCOSE: ICD-10-CM

## 2022-06-25 LAB
ESTIMATED AVERAGE GLUCOSE: 99.7 MG/DL
HBA1C MFR BLD: 5.1 %
TESTOSTERONE TOTAL: 447 NG/DL (ref 220–1000)

## 2022-06-26 ASSESSMENT — ENCOUNTER SYMPTOMS
EYES NEGATIVE: 1
GASTROINTESTINAL NEGATIVE: 1
RESPIRATORY NEGATIVE: 1

## 2022-06-26 NOTE — PROGRESS NOTES
Well Adult Note  Name: Rona Figueroa Date: 2022   MRN: [id number] Sex: Male   Age: 43 y.o. Ethnicity: Non- / Non    : 1980 Race: White (non-)      Ramon Mckoy is here for well adult exam.  History:  Ramon Mckoy is a 43 y.o.. male here for annual exam. He also has two problems he would like to discuss. First, he would like to have an evaluation for ADHD. He has had difficulty with focusing and staying on task his whole life. He states his focus is better when he spends time outdoors, but for his work he must be inside and on a computer, which tends to worsen his symptoms. He reports he had worked on losing weight last year. He has gained some back. He states he eating a healthy diet and exercising regularly. He has gotten back into running, which he enjoys. He reports he has been having a mid-afternoon snack, usually yogurt or a Francisco J bar, which is new since last year. Review of Systems   Constitutional: Positive for unexpected weight change. HENT: Negative. Eyes: Negative. Respiratory: Negative. Cardiovascular: Negative. Gastrointestinal: Negative. Endocrine: Negative. Genitourinary: Negative. Musculoskeletal: Negative. Skin: Negative. Neurological: Negative. Psychiatric/Behavioral: Positive for decreased concentration.        No Known Allergies    Prior to Visit Medications    Not on File       Past Medical History:   Diagnosis Date    Anxiety     Irritable bowel syndrome     Metabolic syndrome     EDDA on CPAP 2022       Past Surgical History:   Procedure Laterality Date    APPENDECTOMY         Family History   Problem Relation Age of Onset    High Cholesterol Father     Cancer Maternal Grandmother         colon    Heart Disease Paternal Grandfather        Social History     Tobacco Use    Smoking status: Never Smoker    Smokeless tobacco: Never Used   Substance Use Topics    Alcohol use: Yes     Comment: socially    Drug use: No       Objective   /82 (Site: Left Upper Arm, Position: Sitting, Cuff Size: Small Adult)   Pulse 54   Ht 5' 10\" (1.778 m)   Wt 282 lb (127.9 kg)   SpO2 98%   BMI 40.46 kg/m²   Wt Readings from Last 3 Encounters:   06/23/22 282 lb (127.9 kg)   06/17/21 284 lb 3.2 oz (128.9 kg)   01/09/20 (!) 300 lb 9.6 oz (136.4 kg)     There were no vitals filed for this visit. Physical Exam  Vitals and nursing note reviewed. Constitutional:       General: He is not in acute distress. Appearance: Normal appearance. He is well-developed. He is obese. He is not diaphoretic. HENT:      Head: Normocephalic and atraumatic. Right Ear: Hearing, tympanic membrane, ear canal and external ear normal. No drainage or tenderness. Left Ear: Hearing, tympanic membrane, ear canal and external ear normal. No drainage or tenderness. Nose: Nose normal. No mucosal edema or rhinorrhea. Right Sinus: No maxillary sinus tenderness or frontal sinus tenderness. Left Sinus: No maxillary sinus tenderness or frontal sinus tenderness. Mouth/Throat:      Mouth: Mucous membranes are moist.      Pharynx: Oropharynx is clear. Uvula midline. No oropharyngeal exudate or posterior oropharyngeal erythema. Eyes:      General: No scleral icterus. Right eye: No discharge. Left eye: No discharge. Extraocular Movements: Extraocular movements intact. Conjunctiva/sclera: Conjunctivae normal.      Pupils: Pupils are equal, round, and reactive to light. Cardiovascular:      Rate and Rhythm: Normal rate and regular rhythm. Heart sounds: Normal heart sounds. No murmur heard. No friction rub. No gallop. Pulmonary:      Effort: Pulmonary effort is normal. No respiratory distress. Breath sounds: Normal breath sounds. No wheezing or rales. Chest:      Chest wall: No tenderness. Abdominal:      General: Abdomen is flat.  Bowel sounds are normal. There is no distension. Palpations: Abdomen is soft. There is no mass. Tenderness: There is no abdominal tenderness. There is no right CVA tenderness, left CVA tenderness, guarding or rebound. Hernia: No hernia is present. Musculoskeletal:      Cervical back: Neck supple. Right lower leg: No edema. Left lower leg: No edema. Lymphadenopathy:      Cervical: No cervical adenopathy. Skin:     General: Skin is warm and dry. Capillary Refill: Capillary refill takes less than 2 seconds. Findings: No rash. Neurological:      General: No focal deficit present. Mental Status: He is alert. Psychiatric:         Behavior: Behavior normal.           Assessment   Plan   1. Routine general medical examination at a health care facility  -     CBC with Auto Differential  -     Comprehensive Metabolic Panel  -     TSH with Reflex  -     Lipid Panel  Health Maintenance reviewed - currently up to date other than COVID vaccination. Specific topics reviewed: importance of regular dental care, minimize junk food, importance of regular exercise, seat belts and eye exams, sunscreen use outdoors. .    2. Unable to lose weight  -     Testosterone  -     Cortisol AM, Total  -     INSULIN, RANDOM  Discussed cutting out the afternoon snacks and see if that helps with weight loss. Continue healthy diet and exercise changes. 3. Attention and concentration deficit  He will set up an appointment with Dr. Lauren Moyer for further work up for ADHD. Personalized Preventive Plan   Current Health Maintenance Status    There is no immunization history on file for this patient.      Health Maintenance   Topic Date Due    COVID-19 Vaccine (1) Never done    DTaP/Tdap/Td vaccine (1 - Tdap) 06/23/2023 (Originally 5/9/1999)    Flu vaccine (Season Ended) 09/01/2022    Depression Screen  06/23/2023    Diabetes screen  06/23/2025    Lipids  06/23/2027    Hepatitis C screen  Completed    HIV screen  Completed    Hepatitis A vaccine  Aged Out    Hepatitis B vaccine  Aged Out    Hib vaccine  Aged Out    Meningococcal (ACWY) vaccine  Aged Out    Pneumococcal 0-64 years Vaccine  Aged Out    Varicella vaccine  Discontinued     Recommendations for Preventive Services Due: see orders and patient instructions/AVS.    Return in about 1 year (around 6/23/2023) for Annual physical; schedule with Dr. Vy Davenport for ADHD evaluation.

## 2022-06-27 LAB — INSULIN: 15 UIU/ML

## 2022-07-14 ENCOUNTER — OFFICE VISIT (OUTPATIENT)
Dept: FAMILY MEDICINE CLINIC | Age: 42
End: 2022-07-14
Payer: COMMERCIAL

## 2022-07-14 VITALS
HEIGHT: 70 IN | WEIGHT: 281.4 LBS | OXYGEN SATURATION: 97 % | SYSTOLIC BLOOD PRESSURE: 138 MMHG | DIASTOLIC BLOOD PRESSURE: 82 MMHG | HEART RATE: 49 BPM | BODY MASS INDEX: 40.29 KG/M2

## 2022-07-14 DIAGNOSIS — R22.31 AXILLARY MASS, RIGHT: Primary | ICD-10-CM

## 2022-07-14 DIAGNOSIS — L91.8 SKIN TAGS, MULTIPLE ACQUIRED: ICD-10-CM

## 2022-07-14 PROCEDURE — 99213 OFFICE O/P EST LOW 20 MIN: CPT | Performed by: FAMILY MEDICINE

## 2022-07-14 ASSESSMENT — PATIENT HEALTH QUESTIONNAIRE - PHQ9
SUM OF ALL RESPONSES TO PHQ QUESTIONS 1-9: 0
SUM OF ALL RESPONSES TO PHQ QUESTIONS 1-9: 0
SUM OF ALL RESPONSES TO PHQ9 QUESTIONS 1 & 2: 0
SUM OF ALL RESPONSES TO PHQ QUESTIONS 1-9: 0
2. FEELING DOWN, DEPRESSED OR HOPELESS: 0
SUM OF ALL RESPONSES TO PHQ QUESTIONS 1-9: 0
1. LITTLE INTEREST OR PLEASURE IN DOING THINGS: 0

## 2022-07-14 NOTE — Clinical Note
Yonathan Motta, this patient reports calling about an acute axillary mass. He was told we didn't have any openings until October and that he needed to go to urgent care for the acute concern, but that I had a follow up appointment available yesterday, so they gave him that. There is no message documented. The patient was upset and confused that they couldn't get an acute appointment, and that it was somehow infinitely easier to get a follow up appointment than a same day appointment. He states he was also not offered an appointment with anyone else in the practice, and he would have gladly seen someone else here instead of going to urgent care. If this could be investigated further, that would be appreciated. We do not want to turn patients away for acute issues, nor wait until October to address acute infections.

## 2022-07-15 NOTE — PROGRESS NOTES
George Zapata (:  1980) is a 43 y.o. male,Established patient, here for evaluation of the following chief complaint(s):  Follow-up (Urgent Care f/u, was a lump in right armpit)         ASSESSMENT/PLAN:  1. Axillary mass, right  Improving with doxycycline. Advised to complete the 10 day course as he still has mild induration. Call or come back in should symptoms worsen again. 2. Skin tags, multiple acquired  Will return for removal here. Return for procedure visit for skin tag removal at soonest available procedure appointment. Subjective   SUBJECTIVE/OBJECTIVE:  Chief Complaint   Patient presents with    Follow-up     Urgent Care f/u, was a lump in right armpit       HPI George Zapata is a 43 y.o. male who presents for evaluation of a mass located   in the right axilla . It has been present for over a week. He reports calling here and being told there were no openings and was advised to go to urgent care. He states he went to urgent care where two providers looked at the axillary mass and determined it to likely be infection, either than abscess or infected lymph node. He was placed on oral doxycycline. He kept this appointment in case it did not improve. He is currently on day 7 of antibiotics and notes a good improvement of the swelling. He denies pain, swelling, bleeding, drainage, fever. He has axillary skin tags since his teen years that he would like to have removed. He is wondering if he might seem dermatology. Review of Systems - per \[facility] [place] [facility]\""       Objective   Physical Exam  Vitals and nursing note reviewed. Constitutional:       Appearance: Normal appearance. Pulmonary:      Effort: Pulmonary effort is normal.   Chest:   Breasts:     Right: No axillary adenopathy or supraclavicular adenopathy. Lymphadenopathy:      Upper Body:      Right upper body: No supraclavicular, axillary or pectoral adenopathy. Skin:     Findings: No erythema.       Comments: 2 cm x 1.5 cm oval area of induration noted in the right axilla. Numerous large skin tags in bilateral axillae. Neurological:      Mental Status: He is alert. An electronic signature was used to authenticate this note.     --Isabel Dowling MD

## 2022-10-25 ENCOUNTER — PROCEDURE VISIT (OUTPATIENT)
Dept: FAMILY MEDICINE CLINIC | Age: 42
End: 2022-10-25
Payer: COMMERCIAL

## 2022-10-25 VITALS
HEART RATE: 76 BPM | BODY MASS INDEX: 41.21 KG/M2 | SYSTOLIC BLOOD PRESSURE: 146 MMHG | WEIGHT: 287.2 LBS | DIASTOLIC BLOOD PRESSURE: 92 MMHG | OXYGEN SATURATION: 97 %

## 2022-10-25 DIAGNOSIS — L91.8 SKIN TAGS, MULTIPLE ACQUIRED: Primary | ICD-10-CM

## 2022-10-25 PROCEDURE — 11200 RMVL SKIN TAGS UP TO&INC 15: CPT | Performed by: FAMILY MEDICINE

## 2022-10-25 PROCEDURE — 11201 RMVL SKIN TAGS EA ADDL 10: CPT | Performed by: FAMILY MEDICINE

## 2022-10-25 ASSESSMENT — PATIENT HEALTH QUESTIONNAIRE - PHQ9
SUM OF ALL RESPONSES TO PHQ9 QUESTIONS 1 & 2: 0
SUM OF ALL RESPONSES TO PHQ QUESTIONS 1-9: 0
2. FEELING DOWN, DEPRESSED OR HOPELESS: 0
1. LITTLE INTEREST OR PLEASURE IN DOING THINGS: 0

## 2022-10-25 NOTE — PROGRESS NOTES
The patient complains of symptomatic skin tags on the bilateral axillae. These are irritated by clothing, jewelry and rubbing. Vitals:    10/25/22 1518   BP: (!) 146/92   Site: Left Upper Arm   Position: Sitting   Cuff Size: Large Adult   Pulse: 76   SpO2: 97%   Weight: 287 lb 3.2 oz (130.3 kg)      Patient appears well. Several benign large and small skin tags are noted on the bilateral axillae. Alejandro Kaminski was seen today for skin tags. Diagnoses and all orders for this visit:    Skin tags, multiple acquired  -     MN REMOVAL OF SKIN TAGS, UP TO 15  -     MN REMOVAL OF SKIN TAGS, EACH ADD 10  Skin tags are snipped off using alcohol swabs for cleansing and sterile iris scissors. Local anesthesia 1% lidocaine with epinephrine was used for larger skin tags. These pathognomonic lesions are not sent for pathology. A total of 47 skin tags were removed today. The patient tolerated to procedure well. Local skin care discussed. Verbal consent was obtained.

## 2022-11-07 ENCOUNTER — PATIENT MESSAGE (OUTPATIENT)
Dept: FAMILY MEDICINE CLINIC | Age: 42
End: 2022-11-07

## 2022-11-07 NOTE — TELEPHONE ENCOUNTER
From: Daniel Costa  To: Dr. Jg Branham: 11/7/2022 1:06 PM EST  Subject: Swelling at skin tag sites    Hi Dr. Pam Swanson,    Two of the larger skin tag sites on my left side are somewhat swollen and uncomfortable. I also feel a bit off. The sites are not red nor hot to the touch and I do not have a fever. They are a little painful to touch though. I have been cleaning with antibacterial bar soap (Safeguard) daily in the shower. I have not tried heat or cold yet as Im not sure which would be better. What do you think I should do?     Farrah Davis

## 2022-12-23 NOTE — TELEPHONE ENCOUNTER
Can follow up with me for additional questions about recurrent boils, or could see dermatology if he would like.

## 2023-05-26 ENCOUNTER — OFFICE VISIT (OUTPATIENT)
Dept: PSYCHOLOGY | Age: 43
End: 2023-05-26

## 2023-05-26 DIAGNOSIS — F41.9 ANXIETY: Primary | ICD-10-CM

## 2023-05-26 ASSESSMENT — ANXIETY QUESTIONNAIRES
3. WORRYING TOO MUCH ABOUT DIFFERENT THINGS: 1
4. TROUBLE RELAXING: 1
6. BECOMING EASILY ANNOYED OR IRRITABLE: 2
7. FEELING AFRAID AS IF SOMETHING AWFUL MIGHT HAPPEN: 0
2. NOT BEING ABLE TO STOP OR CONTROL WORRYING: 0
GAD7 TOTAL SCORE: 6
5. BEING SO RESTLESS THAT IT IS HARD TO SIT STILL: 1
1. FEELING NERVOUS, ANXIOUS, OR ON EDGE: 1
IF YOU CHECKED OFF ANY PROBLEMS ON THIS QUESTIONNAIRE, HOW DIFFICULT HAVE THESE PROBLEMS MADE IT FOR YOU TO DO YOUR WORK, TAKE CARE OF THINGS AT HOME, OR GET ALONG WITH OTHER PEOPLE: SOMEWHAT DIFFICULT

## 2023-05-26 ASSESSMENT — PATIENT HEALTH QUESTIONNAIRE - PHQ9
10. IF YOU CHECKED OFF ANY PROBLEMS, HOW DIFFICULT HAVE THESE PROBLEMS MADE IT FOR YOU TO DO YOUR WORK, TAKE CARE OF THINGS AT HOME, OR GET ALONG WITH OTHER PEOPLE: 0
6. FEELING BAD ABOUT YOURSELF - OR THAT YOU ARE A FAILURE OR HAVE LET YOURSELF OR YOUR FAMILY DOWN: 1
8. MOVING OR SPEAKING SO SLOWLY THAT OTHER PEOPLE COULD HAVE NOTICED. OR THE OPPOSITE, BEING SO FIGETY OR RESTLESS THAT YOU HAVE BEEN MOVING AROUND A LOT MORE THAN USUAL: 1
2. FEELING DOWN, DEPRESSED OR HOPELESS: 0
SUM OF ALL RESPONSES TO PHQ QUESTIONS 1-9: 7
7. TROUBLE CONCENTRATING ON THINGS, SUCH AS READING THE NEWSPAPER OR WATCHING TELEVISION: 2
9. THOUGHTS THAT YOU WOULD BE BETTER OFF DEAD, OR OF HURTING YOURSELF: 0
5. POOR APPETITE OR OVEREATING: 1
SUM OF ALL RESPONSES TO PHQ QUESTIONS 1-9: 7
4. FEELING TIRED OR HAVING LITTLE ENERGY: 2
3. TROUBLE FALLING OR STAYING ASLEEP: 0
SUM OF ALL RESPONSES TO PHQ9 QUESTIONS 1 & 2: 0
1. LITTLE INTEREST OR PLEASURE IN DOING THINGS: 0

## 2023-05-26 NOTE — PROGRESS NOTES
Behavioral Health Consultation  [facility], [address] [place] [address]  Psychologist  5/26/2023  9:08 AM EDT    Time spent with Patient: 30 minutes  This is patient's first [facility] appointment. Reason for Consult:    Chief Complaint   Patient presents with    Other     inattention    Anxiety     Referring Provider: Rosalba Gamboa MD      Pt provided informed consent for the behavioral health program. Discussed with patient model of service to include the limits of confidentiality (i.e. abuse reporting, suicide intervention, etc.) and short-term intervention focused approach. Pt indicated understanding. Feedback given to PCP. S:  Pt reports he wonders about ADHD. Always though he had certain quirks. Hard to read a book - hasn't been able to read a book. Hasn't read since high school. In high school- couldn't sit and watch TV. Now he can. In college- was a math major- would work on complex problems and would listen to very loud music. 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 Works better under pressure, gets motivated by others being upset with him. Hard to intrinsically motivate. Gets distracted easily. Works from home since 2020. Will do other things at home. Saman 1 that works with [employer] - been here for 4 years -  is . When in person, does have own office. Used to work in open office and was much harder. Oldest son is nonberbal autistic. First day of summer break- gets easily distracted by him if he is home. Wife is SAHM. Has been in management roles since 2014. 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 No work performance problems. Performs well over period of time but feels there are peaks and valleys. Not making mistakes that are abnormal. No careless mistakes. Grew up in [place]. Moved here 20 years ago.  Went to [place]- met wife and moved here and finished at [facility] [employer] after taking a

## 2023-06-20 ENCOUNTER — OFFICE VISIT (OUTPATIENT)
Dept: PSYCHOLOGY | Age: 43
End: 2023-06-20
Payer: COMMERCIAL

## 2023-06-20 DIAGNOSIS — F41.1 GAD (GENERALIZED ANXIETY DISORDER): Primary | ICD-10-CM

## 2023-06-20 PROCEDURE — 90832 PSYTX W PT 30 MINUTES: CPT | Performed by: PSYCHOLOGIST

## 2023-06-20 ASSESSMENT — PATIENT HEALTH QUESTIONNAIRE - PHQ9
SUM OF ALL RESPONSES TO PHQ9 QUESTIONS 1 & 2: 1
4. FEELING TIRED OR HAVING LITTLE ENERGY: 0
SUM OF ALL RESPONSES TO PHQ QUESTIONS 1-9: 5
10. IF YOU CHECKED OFF ANY PROBLEMS, HOW DIFFICULT HAVE THESE PROBLEMS MADE IT FOR YOU TO DO YOUR WORK, TAKE CARE OF THINGS AT HOME, OR GET ALONG WITH OTHER PEOPLE: 1
9. THOUGHTS THAT YOU WOULD BE BETTER OFF DEAD, OR OF HURTING YOURSELF: 0
5. POOR APPETITE OR OVEREATING: 1
SUM OF ALL RESPONSES TO PHQ QUESTIONS 1-9: 5
3. TROUBLE FALLING OR STAYING ASLEEP: 0
1. LITTLE INTEREST OR PLEASURE IN DOING THINGS: 0
2. FEELING DOWN, DEPRESSED OR HOPELESS: 1
SUM OF ALL RESPONSES TO PHQ QUESTIONS 1-9: 5
7. TROUBLE CONCENTRATING ON THINGS, SUCH AS READING THE NEWSPAPER OR WATCHING TELEVISION: 2
SUM OF ALL RESPONSES TO PHQ QUESTIONS 1-9: 5
8. MOVING OR SPEAKING SO SLOWLY THAT OTHER PEOPLE COULD HAVE NOTICED. OR THE OPPOSITE, BEING SO FIGETY OR RESTLESS THAT YOU HAVE BEEN MOVING AROUND A LOT MORE THAN USUAL: 0
6. FEELING BAD ABOUT YOURSELF - OR THAT YOU ARE A FAILURE OR HAVE LET YOURSELF OR YOUR FAMILY DOWN: 1

## 2023-06-20 ASSESSMENT — ANXIETY QUESTIONNAIRES
2. NOT BEING ABLE TO STOP OR CONTROL WORRYING: 1
6. BECOMING EASILY ANNOYED OR IRRITABLE: 1
GAD7 TOTAL SCORE: 8
7. FEELING AFRAID AS IF SOMETHING AWFUL MIGHT HAPPEN: 1
3. WORRYING TOO MUCH ABOUT DIFFERENT THINGS: 1
1. FEELING NERVOUS, ANXIOUS, OR ON EDGE: 1
4. TROUBLE RELAXING: 1
5. BEING SO RESTLESS THAT IT IS HARD TO SIT STILL: 2

## 2023-06-26 ENCOUNTER — OFFICE VISIT (OUTPATIENT)
Dept: FAMILY MEDICINE CLINIC | Age: 43
End: 2023-06-26
Payer: COMMERCIAL

## 2023-06-26 VITALS
SYSTOLIC BLOOD PRESSURE: 128 MMHG | DIASTOLIC BLOOD PRESSURE: 78 MMHG | OXYGEN SATURATION: 98 % | HEART RATE: 70 BPM | WEIGHT: 301 LBS | BODY MASS INDEX: 43.09 KG/M2 | HEIGHT: 70 IN

## 2023-06-26 DIAGNOSIS — Z00.00 ROUTINE GENERAL MEDICAL EXAMINATION AT A HEALTH CARE FACILITY: Primary | ICD-10-CM

## 2023-06-26 DIAGNOSIS — Z00.00 ROUTINE GENERAL MEDICAL EXAMINATION AT A HEALTH CARE FACILITY: ICD-10-CM

## 2023-06-26 LAB
ALBUMIN SERPL-MCNC: 4.3 G/DL (ref 3.4–5)
ALBUMIN/GLOB SERPL: 1.9 {RATIO} (ref 1.1–2.2)
ALP SERPL-CCNC: 65 U/L (ref 40–129)
ALT SERPL-CCNC: 26 U/L (ref 10–40)
ANION GAP SERPL CALCULATED.3IONS-SCNC: 11 MMOL/L (ref 3–16)
AST SERPL-CCNC: 20 U/L (ref 15–37)
BILIRUB SERPL-MCNC: 0.6 MG/DL (ref 0–1)
BUN SERPL-MCNC: 10 MG/DL (ref 7–20)
CALCIUM SERPL-MCNC: 9.2 MG/DL (ref 8.3–10.6)
CHLORIDE SERPL-SCNC: 105 MMOL/L (ref 99–110)
CHOLEST SERPL-MCNC: 158 MG/DL (ref 0–199)
CO2 SERPL-SCNC: 26 MMOL/L (ref 21–32)
CREAT SERPL-MCNC: 1 MG/DL (ref 0.9–1.3)
GFR SERPLBLD CREATININE-BSD FMLA CKD-EPI: >60 ML/MIN/{1.73_M2}
GLUCOSE SERPL-MCNC: 105 MG/DL (ref 70–99)
HDLC SERPL-MCNC: 30 MG/DL (ref 40–60)
LDLC SERPL CALC-MCNC: 86 MG/DL
POTASSIUM SERPL-SCNC: 4.1 MMOL/L (ref 3.5–5.1)
PROT SERPL-MCNC: 6.6 G/DL (ref 6.4–8.2)
SODIUM SERPL-SCNC: 142 MMOL/L (ref 136–145)
TRIGL SERPL-MCNC: 209 MG/DL (ref 0–150)
VLDLC SERPL CALC-MCNC: 42 MG/DL

## 2023-06-26 PROCEDURE — 99396 PREV VISIT EST AGE 40-64: CPT | Performed by: FAMILY MEDICINE

## 2023-06-26 SDOH — ECONOMIC STABILITY: INCOME INSECURITY: HOW HARD IS IT FOR YOU TO PAY FOR THE VERY BASICS LIKE FOOD, HOUSING, MEDICAL CARE, AND HEATING?: NOT HARD AT ALL

## 2023-06-26 SDOH — ECONOMIC STABILITY: FOOD INSECURITY: WITHIN THE PAST 12 MONTHS, THE FOOD YOU BOUGHT JUST DIDN'T LAST AND YOU DIDN'T HAVE MONEY TO GET MORE.: NEVER TRUE

## 2023-06-26 SDOH — ECONOMIC STABILITY: FOOD INSECURITY: WITHIN THE PAST 12 MONTHS, YOU WORRIED THAT YOUR FOOD WOULD RUN OUT BEFORE YOU GOT MONEY TO BUY MORE.: NEVER TRUE

## 2023-06-26 ASSESSMENT — ENCOUNTER SYMPTOMS
GASTROINTESTINAL NEGATIVE: 1
EYES NEGATIVE: 1
RESPIRATORY NEGATIVE: 1

## 2023-07-05 ENCOUNTER — TELEPHONE (OUTPATIENT)
Dept: FAMILY MEDICINE CLINIC | Age: 43
End: 2023-07-05

## 2023-07-06 NOTE — TELEPHONE ENCOUNTER
Please call patient regarding Gene Sight results. It looks like if he would be interested in trying something, Prozac would be a good medication for him. If he is interested, that can be sent in for him.

## 2023-07-07 DIAGNOSIS — F41.1 GAD (GENERALIZED ANXIETY DISORDER): Primary | ICD-10-CM

## 2023-07-07 RX ORDER — FLUOXETINE 10 MG/1
10 CAPSULE ORAL DAILY
Qty: 30 CAPSULE | Refills: 1 | Status: SHIPPED | OUTPATIENT
Start: 2023-07-07

## 2023-08-01 DIAGNOSIS — F41.1 GAD (GENERALIZED ANXIETY DISORDER): ICD-10-CM

## 2023-08-01 RX ORDER — FLUOXETINE 10 MG/1
CAPSULE ORAL
Qty: 30 CAPSULE | Refills: 1 | OUTPATIENT
Start: 2023-08-01

## 2023-08-01 NOTE — TELEPHONE ENCOUNTER
Refill Request     CONFIRM preferred pharmacy with the patient. If Mail Order Rx - Pend for 90 day refill. Last Seen: Last Seen Department: 6/26/2023  Last Seen by PCP: Visit date not found    Last Written: 7/7/2023    If no future appointment scheduled:  Review the last OV with PCP and review information for follow-up visit,  Route STAFF MESSAGE with patient name to the [facility] for scheduling with the following information:            -  Timing of next visit           -  Visit type ie Physical, OV, etc           -  Diagnoses/Reason ie. COPD, HTN - Do not use MEDICATION, Follow-up or CHECK UP - Give reason for visit      Next Appointment:   Future Appointments   Date Time Provider [address]   6/27/2024  8:00 AM MD MARTÍN Henson  Sh[place]a - LEONID       Message sent to [address] [place] [address] to schedule appt with patient?   NO      Requested Prescriptions     Pending Prescriptions Disp Refills    FLUoxetine (PROZAC) 10 MG capsule [Pharmacy Med Name: FLUOXETINE HCL 10 MG CAPSULE] 30 capsule 1     Sig: TAKE 1 CAPSULE BY MOUTH EVERY DAY

## 2023-09-06 DIAGNOSIS — F41.1 GAD (GENERALIZED ANXIETY DISORDER): ICD-10-CM

## 2023-09-06 NOTE — TELEPHONE ENCOUNTER
Refill Request     CONFIRM preferred pharmacy with the patient. If Mail Order Rx - Pend for 90 day refill. Last Seen: Last Seen Department: 6/26/2023  Last Seen by PCP: Visit date not found    Last Written: 7/07/2023, #30, 1 refill    If no future appointment scheduled:  Review the last OV with PCP and review information for follow-up visit,  Route STAFF MESSAGE with patient name to the [facility] for scheduling with the following information:            -  Timing of next visit           -  Visit type ie Physical, OV, etc           -  Diagnoses/Reason ie. COPD, HTN - Do not use MEDICATION, Follow-up or CHECK UP - Give reason for visit      Next Appointment:   Future Appointments   Date Time Provider [address] [place] [address]   6/27/2024  8:00 [facility]       Message sent to [address] [place] [address] to schedule appt with patient?   N/A      Requested Prescriptions     Pending Prescriptions Disp Refills    FLUoxetine (PROZAC) 10 MG capsule [Pharmacy Med Name: FLUOXETINE HCL 10 MG CAPSULE] 30 capsule 1     Sig: TAKE 1 CAPSULE BY MOUTH EVERY DAY

## 2023-09-08 RX ORDER — FLUOXETINE 10 MG/1
CAPSULE ORAL
Qty: 90 CAPSULE | Refills: 1 | Status: SHIPPED | OUTPATIENT
Start: 2023-09-08

## 2024-02-19 DIAGNOSIS — F41.1 GAD (GENERALIZED ANXIETY DISORDER): ICD-10-CM

## 2024-02-19 NOTE — TELEPHONE ENCOUNTER
Refill Request     CONFIRM preferred pharmacy with the patient.    If Mail Order Rx - Pend for 90 day refill.      Last Seen: Last Seen Department: 6/26/2023  Last Seen by PCP: 6/26/2023    Last Written: 09/08/23 90 with 1 refill    If no future appointment scheduled:  Review the last OV with PCP and review information for follow-up visit,  Route STAFF MESSAGE with patient name to the  Pool for scheduling with the following information:            -  Timing of next visit           -  Visit type ie Physical, OV, etc           -  Diagnoses/Reason ie. COPD, HTN - Do not use MEDICATION, Follow-up or CHECK UP - Give reason for visit      Next Appointment:   Future Appointments   Date Time Provider Department Center   6/27/2024  8:00 AM Carol Sun MD [facility] - DYD       Message sent to  to schedule appt with patient?  NO      Requested Prescriptions     Pending Prescriptions Disp Refills    FLUoxetine (PROZAC) 10 MG capsule [Pharmacy Med Name: FLUOXETINE HCL 10 MG CAPSULE] 90 capsule 1     Sig: TAKE 1 CAPSULE BY MOUTH EVERY DAY

## 2024-02-22 RX ORDER — FLUOXETINE 10 MG/1
CAPSULE ORAL
Qty: 90 CAPSULE | Refills: 1 | Status: SHIPPED | OUTPATIENT
Start: 2024-02-22

## 2024-06-26 ASSESSMENT — PATIENT HEALTH QUESTIONNAIRE - PHQ9
6. FEELING BAD ABOUT YOURSELF - OR THAT YOU ARE A FAILURE OR HAVE LET YOURSELF OR YOUR FAMILY DOWN: SEVERAL DAYS
5. POOR APPETITE OR OVEREATING: SEVERAL DAYS
2. FEELING DOWN, DEPRESSED OR HOPELESS: NOT AT ALL
9. THOUGHTS THAT YOU WOULD BE BETTER OFF DEAD, OR OF HURTING YOURSELF: NOT AT ALL
1. LITTLE INTEREST OR PLEASURE IN DOING THINGS: NOT AT ALL
10. IF YOU CHECKED OFF ANY PROBLEMS, HOW DIFFICULT HAVE THESE PROBLEMS MADE IT FOR YOU TO DO YOUR WORK, TAKE CARE OF THINGS AT HOME, OR GET ALONG WITH OTHER PEOPLE: NOT DIFFICULT AT ALL
SUM OF ALL RESPONSES TO PHQ QUESTIONS 1-9: 2
2. FEELING DOWN, DEPRESSED OR HOPELESS: NOT AT ALL
3. TROUBLE FALLING OR STAYING ASLEEP: NOT AT ALL
SUM OF ALL RESPONSES TO PHQ9 QUESTIONS 1 & 2: 0
7. TROUBLE CONCENTRATING ON THINGS, SUCH AS READING THE NEWSPAPER OR WATCHING TELEVISION: NOT AT ALL
SUM OF ALL RESPONSES TO PHQ QUESTIONS 1-9: 2
8. MOVING OR SPEAKING SO SLOWLY THAT OTHER PEOPLE COULD HAVE NOTICED. OR THE OPPOSITE, BEING SO FIGETY OR RESTLESS THAT YOU HAVE BEEN MOVING AROUND A LOT MORE THAN USUAL: NOT AT ALL
SUM OF ALL RESPONSES TO PHQ QUESTIONS 1-9: 2
4. FEELING TIRED OR HAVING LITTLE ENERGY: NOT AT ALL
SUM OF ALL RESPONSES TO PHQ9 QUESTIONS 1 & 2: 0
1. LITTLE INTEREST OR PLEASURE IN DOING THINGS: NOT AT ALL
SUM OF ALL RESPONSES TO PHQ QUESTIONS 1-9: 2

## 2024-06-27 ENCOUNTER — OFFICE VISIT (OUTPATIENT)
Dept: FAMILY MEDICINE CLINIC | Age: 44
End: 2024-06-27
Payer: COMMERCIAL

## 2024-06-27 VITALS
DIASTOLIC BLOOD PRESSURE: 86 MMHG | TEMPERATURE: 97.3 F | HEART RATE: 56 BPM | BODY MASS INDEX: 46.74 KG/M2 | OXYGEN SATURATION: 98 % | WEIGHT: 297.8 LBS | HEIGHT: 67 IN | SYSTOLIC BLOOD PRESSURE: 140 MMHG

## 2024-06-27 DIAGNOSIS — F43.9 STRESS AT HOME: ICD-10-CM

## 2024-06-27 DIAGNOSIS — Z23 NEED FOR HPV VACCINATION: ICD-10-CM

## 2024-06-27 DIAGNOSIS — B07.9 VERRUCA VULGARIS: ICD-10-CM

## 2024-06-27 DIAGNOSIS — Z00.00 ROUTINE GENERAL MEDICAL EXAMINATION AT A HEALTH CARE FACILITY: ICD-10-CM

## 2024-06-27 DIAGNOSIS — Z00.00 ROUTINE GENERAL MEDICAL EXAMINATION AT A HEALTH CARE FACILITY: Primary | ICD-10-CM

## 2024-06-27 LAB
ALBUMIN SERPL-MCNC: 4.5 G/DL (ref 3.4–5)
ALBUMIN/GLOB SERPL: 1.9 {RATIO} (ref 1.1–2.2)
ALP SERPL-CCNC: 65 U/L (ref 40–129)
ALT SERPL-CCNC: 32 U/L (ref 10–40)
ANION GAP SERPL CALCULATED.3IONS-SCNC: 9 MMOL/L (ref 3–16)
AST SERPL-CCNC: 22 U/L (ref 15–37)
BASOPHILS # BLD: 0 K/UL (ref 0–0.2)
BASOPHILS NFR BLD: 0.7 %
BILIRUB SERPL-MCNC: 0.7 MG/DL (ref 0–1)
BUN SERPL-MCNC: 12 MG/DL (ref 7–20)
CALCIUM SERPL-MCNC: 9.4 MG/DL (ref 8.3–10.6)
CHLORIDE SERPL-SCNC: 104 MMOL/L (ref 99–110)
CHOLEST SERPL-MCNC: 172 MG/DL (ref 0–199)
CO2 SERPL-SCNC: 28 MMOL/L (ref 21–32)
CREAT SERPL-MCNC: 1 MG/DL (ref 0.9–1.3)
DEPRECATED RDW RBC AUTO: 13.5 % (ref 12.4–15.4)
EOSINOPHIL # BLD: 0.2 K/UL (ref 0–0.6)
EOSINOPHIL NFR BLD: 3 %
GFR SERPLBLD CREATININE-BSD FMLA CKD-EPI: >90 ML/MIN/{1.73_M2}
GLUCOSE SERPL-MCNC: 116 MG/DL (ref 70–99)
HCT VFR BLD AUTO: 43.3 % (ref 40.5–52.5)
HDLC SERPL-MCNC: 32 MG/DL (ref 40–60)
HGB BLD-MCNC: 15.1 G/DL (ref 13.5–17.5)
LDLC SERPL CALC-MCNC: 103 MG/DL
LYMPHOCYTES # BLD: 1.3 K/UL (ref 1–5.1)
LYMPHOCYTES NFR BLD: 22 %
MCH RBC QN AUTO: 30.3 PG (ref 26–34)
MCHC RBC AUTO-ENTMCNC: 34.8 G/DL (ref 31–36)
MCV RBC AUTO: 86.9 FL (ref 80–100)
MONOCYTES # BLD: 0.4 K/UL (ref 0–1.3)
MONOCYTES NFR BLD: 7.4 %
NEUTROPHILS # BLD: 4 K/UL (ref 1.7–7.7)
NEUTROPHILS NFR BLD: 66.9 %
PLATELET # BLD AUTO: 176 K/UL (ref 135–450)
PMV BLD AUTO: 8.8 FL (ref 5–10.5)
POTASSIUM SERPL-SCNC: 4.2 MMOL/L (ref 3.5–5.1)
PROT SERPL-MCNC: 6.9 G/DL (ref 6.4–8.2)
RBC # BLD AUTO: 4.98 M/UL (ref 4.2–5.9)
SODIUM SERPL-SCNC: 141 MMOL/L (ref 136–145)
TRIGL SERPL-MCNC: 184 MG/DL (ref 0–150)
VLDLC SERPL CALC-MCNC: 37 MG/DL
WBC # BLD AUTO: 6 K/UL (ref 4–11)

## 2024-06-27 PROCEDURE — 90471 IMMUNIZATION ADMIN: CPT | Performed by: FAMILY MEDICINE

## 2024-06-27 PROCEDURE — 90651 9VHPV VACCINE 2/3 DOSE IM: CPT | Performed by: FAMILY MEDICINE

## 2024-06-27 PROCEDURE — 99396 PREV VISIT EST AGE 40-64: CPT | Performed by: FAMILY MEDICINE

## 2024-06-27 SDOH — ECONOMIC STABILITY: FOOD INSECURITY: WITHIN THE PAST 12 MONTHS, YOU WORRIED THAT YOUR FOOD WOULD RUN OUT BEFORE YOU GOT MONEY TO BUY MORE.: NEVER TRUE

## 2024-06-27 SDOH — ECONOMIC STABILITY: INCOME INSECURITY: HOW HARD IS IT FOR YOU TO PAY FOR THE VERY BASICS LIKE FOOD, HOUSING, MEDICAL CARE, AND HEATING?: NOT HARD AT ALL

## 2024-06-27 SDOH — ECONOMIC STABILITY: FOOD INSECURITY: WITHIN THE PAST 12 MONTHS, THE FOOD YOU BOUGHT JUST DIDN'T LAST AND YOU DIDN'T HAVE MONEY TO GET MORE.: NEVER TRUE

## 2024-06-27 ASSESSMENT — ANXIETY QUESTIONNAIRES
4. TROUBLE RELAXING: NOT AT ALL
2. NOT BEING ABLE TO STOP OR CONTROL WORRYING: NOT AT ALL
5. BEING SO RESTLESS THAT IT IS HARD TO SIT STILL: NOT AT ALL
6. BECOMING EASILY ANNOYED OR IRRITABLE: SEVERAL DAYS
3. WORRYING TOO MUCH ABOUT DIFFERENT THINGS: SEVERAL DAYS
7. FEELING AFRAID AS IF SOMETHING AWFUL MIGHT HAPPEN: NOT AT ALL
GAD7 TOTAL SCORE: 3
1. FEELING NERVOUS, ANXIOUS, OR ON EDGE: SEVERAL DAYS
IF YOU CHECKED OFF ANY PROBLEMS ON THIS QUESTIONNAIRE, HOW DIFFICULT HAVE THESE PROBLEMS MADE IT FOR YOU TO DO YOUR WORK, TAKE CARE OF THINGS AT HOME, OR GET ALONG WITH OTHER PEOPLE: NOT DIFFICULT AT ALL

## 2024-06-27 ASSESSMENT — ENCOUNTER SYMPTOMS
GASTROINTESTINAL NEGATIVE: 1
EYES NEGATIVE: 1
RESPIRATORY NEGATIVE: 1

## 2024-06-27 NOTE — PROGRESS NOTES
Well Adult Note  Name: Pj Zamarripa Today’s Date: 2024   MRN: [id number] Sex: Male   Age: 44 y.o. Ethnicity: Non- / Non    : 1980 Race: White (non-)      Pj Zamarripa is here for well adult exam.  History:  Has been doing well overall.   Has not been able to add in exercise as much as he wanted. Barrier has been wife's alcoholism. She will drink when he is not home. His children then have stress related to this, so he stays home to be a stable presence for them. He has been attending [facility] and finds it helpful. Did individual counseling, not seeing anyone at the moment.     Review of Systems   Constitutional: Negative.    HENT: Negative.     Eyes: Negative.    Respiratory: Negative.     Cardiovascular: Negative.    Gastrointestinal: Negative.    Genitourinary: Negative.    Musculoskeletal: Negative.    Skin:         +warts on fingers, also on face, seeing derm   Neurological: Negative.        No Known Allergies      Prior to Visit Medications    Medication Sig Taking? Authorizing Provider   FLUoxetine (PROZAC) 10 MG capsule TAKE 1 CAPSULE BY MOUTH EVERY DAY Yes Carol Sun MD         Past Medical History:   Diagnosis Date    Anxiety     Irritable bowel syndrome     Metabolic syndrome     Obesity     EDDA on CPAP 2022       Past Surgical History:   Procedure Laterality Date    APPENDECTOMY           Family History   Problem Relation Age of Onset    High Cholesterol Father     Cancer Maternal Grandmother         colon    Heart Disease Paternal Grandfather        Social History     Tobacco Use    Smoking status: Never    Smokeless tobacco: Never   Substance Use Topics    Alcohol use: Not Currently     Comment: socially    Drug use: Never       Objective     Vital Signs  BP (!) 140/86   Pulse 56   Temp 97.3 °F (36.3 °C) (Temporal)   Ht 1.702 m (5' 7\")   Wt 135.1 kg (297 lb 12.8 oz)   SpO2 98%   BMI 46.64 kg/m²   Wt Readings from Last 3 Encounters:   24

## 2024-06-28 LAB
EST. AVERAGE GLUCOSE BLD GHB EST-MCNC: 96.8 MG/DL
HBA1C MFR BLD: 5 %

## 2024-08-02 DIAGNOSIS — F41.1 GAD (GENERALIZED ANXIETY DISORDER): ICD-10-CM

## 2024-08-02 RX ORDER — FLUOXETINE 10 MG/1
CAPSULE ORAL
Qty: 90 CAPSULE | Refills: 1 | Status: SHIPPED | OUTPATIENT
Start: 2024-08-02

## 2024-08-02 NOTE — TELEPHONE ENCOUNTER
Refill Request     CONFIRM preferred pharmacy with the patient.    If Mail Order Rx - Pend for 90 day refill.      Last Seen: Last Seen Department: 6/27/2024  Last Seen by PCP: 6/27/2024    Last Written: 2/22/24 #90 refills 1    If no future appointment scheduled:  Review the last OV with PCP and review information for follow-up visit,  Route STAFF MESSAGE with patient name to the  Pool for scheduling with the following information:            -  Timing of next visit           -  Visit type ie Physical, OV, etc           -  Diagnoses/Reason ie. COPD, HTN - Do not use MEDICATION, Follow-up or CHECK UP - Give reason for visit      Next Appointment:   Future Appointments   Date Time Provider Department Center   7/1/2025  8:00 AM Carol Sun MD [facility]       Message sent to  to schedule appt with patient?  NO      Requested Prescriptions     Pending Prescriptions Disp Refills    FLUoxetine (PROZAC) 10 MG capsule [Pharmacy Med Name: FLUOXETINE HCL 10 MG CAPSULE] 90 capsule 1     Sig: TAKE 1 CAPSULE BY MOUTH EVERY DAY

## 2024-11-25 ENCOUNTER — PATIENT MESSAGE (OUTPATIENT)
Dept: FAMILY MEDICINE CLINIC | Age: 44
End: 2024-11-25

## 2024-12-26 ENCOUNTER — NURSE ONLY (OUTPATIENT)
Dept: FAMILY MEDICINE CLINIC | Age: 44
End: 2024-12-26
Payer: COMMERCIAL

## 2024-12-26 DIAGNOSIS — Z23 NEED FOR HPV VACCINATION: Primary | ICD-10-CM

## 2024-12-26 PROCEDURE — 90651 9VHPV VACCINE 2/3 DOSE IM: CPT | Performed by: FAMILY MEDICINE

## 2024-12-26 PROCEDURE — 90471 IMMUNIZATION ADMIN: CPT | Performed by: FAMILY MEDICINE

## 2025-03-17 DIAGNOSIS — F41.1 GAD (GENERALIZED ANXIETY DISORDER): ICD-10-CM

## 2025-03-18 RX ORDER — FLUOXETINE 10 MG/1
10 CAPSULE ORAL DAILY
Qty: 90 CAPSULE | Refills: 1 | Status: SHIPPED | OUTPATIENT
Start: 2025-03-18

## 2025-06-28 ASSESSMENT — PATIENT HEALTH QUESTIONNAIRE - PHQ9
SUM OF ALL RESPONSES TO PHQ QUESTIONS 1-9: 1
SUM OF ALL RESPONSES TO PHQ9 QUESTIONS 1 & 2: 0
1. LITTLE INTEREST OR PLEASURE IN DOING THINGS: NOT AT ALL
7. TROUBLE CONCENTRATING ON THINGS, SUCH AS READING THE NEWSPAPER OR WATCHING TELEVISION: NOT AT ALL
SUM OF ALL RESPONSES TO PHQ QUESTIONS 1-9: 1
6. FEELING BAD ABOUT YOURSELF - OR THAT YOU ARE A FAILURE OR HAVE LET YOURSELF OR YOUR FAMILY DOWN: NOT AT ALL
3. TROUBLE FALLING OR STAYING ASLEEP: NOT AT ALL
2. FEELING DOWN, DEPRESSED OR HOPELESS: NOT AT ALL
8. MOVING OR SPEAKING SO SLOWLY THAT OTHER PEOPLE COULD HAVE NOTICED. OR THE OPPOSITE, BEING SO FIGETY OR RESTLESS THAT YOU HAVE BEEN MOVING AROUND A LOT MORE THAN USUAL: NOT AT ALL
1. LITTLE INTEREST OR PLEASURE IN DOING THINGS: NOT AT ALL
4. FEELING TIRED OR HAVING LITTLE ENERGY: SEVERAL DAYS
SUM OF ALL RESPONSES TO PHQ QUESTIONS 1-9: 1
9. THOUGHTS THAT YOU WOULD BE BETTER OFF DEAD, OR OF HURTING YOURSELF: NOT AT ALL
2. FEELING DOWN, DEPRESSED OR HOPELESS: NOT AT ALL
SUM OF ALL RESPONSES TO PHQ QUESTIONS 1-9: 1
10. IF YOU CHECKED OFF ANY PROBLEMS, HOW DIFFICULT HAVE THESE PROBLEMS MADE IT FOR YOU TO DO YOUR WORK, TAKE CARE OF THINGS AT HOME, OR GET ALONG WITH OTHER PEOPLE: NOT DIFFICULT AT ALL
5. POOR APPETITE OR OVEREATING: NOT AT ALL

## 2025-07-01 ENCOUNTER — OFFICE VISIT (OUTPATIENT)
Dept: FAMILY MEDICINE CLINIC | Age: 45
End: 2025-07-01
Payer: COMMERCIAL

## 2025-07-01 VITALS
DIASTOLIC BLOOD PRESSURE: 100 MMHG | HEART RATE: 56 BPM | HEIGHT: 67 IN | TEMPERATURE: 97.9 F | WEIGHT: 301.2 LBS | BODY MASS INDEX: 47.27 KG/M2 | SYSTOLIC BLOOD PRESSURE: 140 MMHG | OXYGEN SATURATION: 97 %

## 2025-07-01 DIAGNOSIS — G43.109 OCULAR MIGRAINE: ICD-10-CM

## 2025-07-01 DIAGNOSIS — Z12.11 COLON CANCER SCREENING: ICD-10-CM

## 2025-07-01 DIAGNOSIS — Z00.00 ROUTINE GENERAL MEDICAL EXAMINATION AT A HEALTH CARE FACILITY: Primary | ICD-10-CM

## 2025-07-01 DIAGNOSIS — R03.0 ELEVATED BLOOD PRESSURE READING: ICD-10-CM

## 2025-07-01 PROCEDURE — 99396 PREV VISIT EST AGE 40-64: CPT | Performed by: FAMILY MEDICINE

## 2025-07-01 SDOH — ECONOMIC STABILITY: FOOD INSECURITY: WITHIN THE PAST 12 MONTHS, THE FOOD YOU BOUGHT JUST DIDN'T LAST AND YOU DIDN'T HAVE MONEY TO GET MORE.: NEVER TRUE

## 2025-07-01 SDOH — ECONOMIC STABILITY: FOOD INSECURITY: WITHIN THE PAST 12 MONTHS, YOU WORRIED THAT YOUR FOOD WOULD RUN OUT BEFORE YOU GOT MONEY TO BUY MORE.: NEVER TRUE

## 2025-07-01 ASSESSMENT — ENCOUNTER SYMPTOMS
GASTROINTESTINAL NEGATIVE: 1
EYES NEGATIVE: 1
RESPIRATORY NEGATIVE: 1

## 2025-07-01 NOTE — PROGRESS NOTES
Well Adult Note  Name: Pj Zamarripa Today’s Date: 2025   MRN: [id number] Sex: Male   Age: 45 y.o. Ethnicity: Non- / Non    : 1980 Race: White (non-)      Pj Zamarripa is here for a well adult exam.          Assessment & Plan      Routine general medical examination at a health care facility  -     CBC with Auto Differential; Future  -     Comprehensive Metabolic Panel; Future  -     TSH reflex to FT4; Future  -     Lipid Panel; Future  -     Hemoglobin A1C; Future  Health maintenance  - Referral for colon cancer screening will be provided  - Declined the third dose of the HPV vaccine and the hepatitis B vaccines  - Blood work will be ordered to monitor health status  - Blood pressure will be rechecked during this visit  Colon cancer screening  -     JULIA - Shayan Saleh MD, Gastroenterology (ERCP & EUS), [facility]  Ocular migraine  - Experiencing ocular migraines characterized by visual disturbances and occasional headaches  - Discussed potential triggers including bright lights, physical exertion, lack of sleep, stress, and certain foods  - Advised to take Tylenol or ibuprofen as needed and maintain adequate hydration; use of electrolytes such as Gatorade or Powerade suggested  - Encouraged to identify and avoid potential triggers; may consider taking a vitamin B6 supplement if migraines persist  Elevated blood pressure reading  -     [facility] Blood Pressure Flowsheet    Results         Return in 1 year (on 2026) for CPE (Physical Exam).     Subjective   History of Present Illness  The patient presents for a checkup.    He reports a slight increase in his blood pressure and acknowledges risk factors for metabolic syndrome and prediabetes. Despite these concerns, he has not experienced any weight loss over the past year. He is interested in having his blood work done this year. He has received 2 doses of the HPV vaccine but declines the third dose. He has not received

## 2025-07-07 ENCOUNTER — PATIENT MESSAGE (OUTPATIENT)
Dept: FAMILY MEDICINE CLINIC | Age: 45
End: 2025-07-07

## 2025-07-07 DIAGNOSIS — I10 HYPERTENSION, UNSPECIFIED TYPE: Primary | ICD-10-CM

## 2025-07-07 RX ORDER — LISINOPRIL 10 MG/1
10 TABLET ORAL DAILY
Qty: 30 TABLET | Refills: 0 | Status: SHIPPED | OUTPATIENT
Start: 2025-07-07

## 2025-07-24 ENCOUNTER — OFFICE VISIT (OUTPATIENT)
Dept: FAMILY MEDICINE CLINIC | Age: 45
End: 2025-07-24

## 2025-07-24 VITALS
BODY MASS INDEX: 46.46 KG/M2 | OXYGEN SATURATION: 98 % | WEIGHT: 296 LBS | SYSTOLIC BLOOD PRESSURE: 162 MMHG | DIASTOLIC BLOOD PRESSURE: 96 MMHG | HEART RATE: 66 BPM | HEIGHT: 67 IN

## 2025-07-24 DIAGNOSIS — Z00.00 ROUTINE GENERAL MEDICAL EXAMINATION AT A HEALTH CARE FACILITY: ICD-10-CM

## 2025-07-24 DIAGNOSIS — I10 HYPERTENSION, UNSPECIFIED TYPE: Primary | ICD-10-CM

## 2025-07-24 DIAGNOSIS — I10 HYPERTENSION, UNSPECIFIED TYPE: ICD-10-CM

## 2025-07-24 LAB
ALBUMIN SERPL-MCNC: 4.5 G/DL (ref 3.4–5)
ALBUMIN/GLOB SERPL: 2 {RATIO} (ref 1.1–2.2)
ALP SERPL-CCNC: 60 U/L (ref 40–129)
ALT SERPL-CCNC: 28 U/L (ref 10–40)
ANION GAP SERPL CALCULATED.3IONS-SCNC: 11 MMOL/L (ref 3–16)
AST SERPL-CCNC: 21 U/L (ref 15–37)
BASOPHILS # BLD: 0 K/UL (ref 0–0.2)
BASOPHILS NFR BLD: 0.7 %
BILIRUB SERPL-MCNC: 1.1 MG/DL (ref 0–1)
BUN SERPL-MCNC: 15 MG/DL (ref 7–20)
CALCIUM SERPL-MCNC: 9.6 MG/DL (ref 8.3–10.6)
CHLORIDE SERPL-SCNC: 101 MMOL/L (ref 99–110)
CHOLEST SERPL-MCNC: 172 MG/DL (ref 0–199)
CO2 SERPL-SCNC: 27 MMOL/L (ref 21–32)
CREAT SERPL-MCNC: 1.2 MG/DL (ref 0.9–1.3)
DEPRECATED RDW RBC AUTO: 13 % (ref 12.4–15.4)
EOSINOPHIL # BLD: 0.1 K/UL (ref 0–0.6)
EOSINOPHIL NFR BLD: 2.4 %
EST. AVERAGE GLUCOSE BLD GHB EST-MCNC: 102.5 MG/DL
GFR SERPLBLD CREATININE-BSD FMLA CKD-EPI: 76 ML/MIN/{1.73_M2}
GLUCOSE SERPL-MCNC: 108 MG/DL (ref 70–99)
HBA1C MFR BLD: 5.2 %
HCT VFR BLD AUTO: 44.9 % (ref 40.5–52.5)
HDLC SERPL-MCNC: 27 MG/DL (ref 40–60)
HGB BLD-MCNC: 15.7 G/DL (ref 13.5–17.5)
LDLC SERPL CALC-MCNC: 109 MG/DL
LYMPHOCYTES # BLD: 1.4 K/UL (ref 1–5.1)
LYMPHOCYTES NFR BLD: 27.9 %
MCH RBC QN AUTO: 29.8 PG (ref 26–34)
MCHC RBC AUTO-ENTMCNC: 35 G/DL (ref 31–36)
MCV RBC AUTO: 85 FL (ref 80–100)
MONOCYTES # BLD: 0.5 K/UL (ref 0–1.3)
MONOCYTES NFR BLD: 10.1 %
NEUTROPHILS # BLD: 3 K/UL (ref 1.7–7.7)
NEUTROPHILS NFR BLD: 58.9 %
PLATELET # BLD AUTO: 174 K/UL (ref 135–450)
PMV BLD AUTO: 8.8 FL (ref 5–10.5)
POTASSIUM SERPL-SCNC: 4.3 MMOL/L (ref 3.5–5.1)
PROT SERPL-MCNC: 6.8 G/DL (ref 6.4–8.2)
RBC # BLD AUTO: 5.28 M/UL (ref 4.2–5.9)
SODIUM SERPL-SCNC: 139 MMOL/L (ref 136–145)
TRIGL SERPL-MCNC: 178 MG/DL (ref 0–150)
TSH SERPL DL<=0.005 MIU/L-ACNC: 4.15 UIU/ML (ref 0.27–4.2)
VLDLC SERPL CALC-MCNC: 36 MG/DL
WBC # BLD AUTO: 5.1 K/UL (ref 4–11)

## 2025-07-24 RX ORDER — LOSARTAN POTASSIUM AND HYDROCHLOROTHIAZIDE 12.5; 5 MG/1; MG/1
1 TABLET ORAL DAILY
Qty: 30 TABLET | Refills: 5 | Status: SHIPPED | OUTPATIENT
Start: 2025-07-24

## 2025-07-24 ASSESSMENT — ENCOUNTER SYMPTOMS
SHORTNESS OF BREATH: 0
CHEST TIGHTNESS: 0
WHEEZING: 0
CHOKING: 0
COUGH: 1

## 2025-07-24 NOTE — PROGRESS NOTES
Pj Zamarripa (:  1980) is a 45 y.o. male,Established patient, here for evaluation of the following chief complaint(s):  Follow-up (Bp medication 2 wk follow up and checking cuff /Dry cough side effects/Personal cuff read 171/92 -- manual cuff read 162/96)         Assessment & Plan  Hypertension, unspecified type   Chronic, not at goal (unstable), side effects to lisinopril- will stop. Start on hyzaar. Medication risks, benefits and side effects discussed. Discussed diet changes that would be beneficial to health condition. Follow up in 2 weeks, complete labs today    Orders:    Renin Activity and Aldosterone; Future    losartan-hydroCHLOROthiazide (HYZAAR) 50-12.5 MG per tablet; Take 1 tablet by mouth daily      Return in about 2 weeks (around 2025) for HTN.       Subjective   HPI    History of Present Illness  The patient presents for evaluation of hypertension.    Hypertension  - Recently diagnosed with hypertension and started on lisinopril by Dr. Sun a few weeks ago  - Home blood pressure readings have been decreasing but remain higher than desired  - On 2025, his blood pressure was 140/100, and subsequent home readings were in the 170s  - Open to switching medications due to persistent cough- cough is so severe that he will sometimes gag  - Reports no other side effects from the medication  - Experiences fatigue more quickly than before  - Would like to increase his energy levels  - Has a history of heart palpitations, which he attributes to anxiety  - Reports no shortness of breath or chest tightness  - Has a low pulse rate, which was present even when he was more active and athletic  - History of chronic inflammation and pitting edema in his shins, diagnosed in his 20s    Social History  - Diet: Low in sugar and caffeine, consumes dairy, red meat, and vegetables  - Coffee/Tea/Caffeine-containing Drinks: Drinks one large cup of coffee daily    FAMILY HISTORY  His father had metabolic

## 2025-07-27 LAB
ALDOST SERPL-MCNC: 15.5 NG/DL
ALDOST/RENIN PLAS-RTO: 7.4 RATIO
RENIN PLAS-CCNC: 2.1 NG/ML/HR

## 2025-08-15 ENCOUNTER — OFFICE VISIT (OUTPATIENT)
Dept: FAMILY MEDICINE CLINIC | Age: 45
End: 2025-08-15
Payer: COMMERCIAL

## 2025-08-15 VITALS
TEMPERATURE: 98.4 F | HEIGHT: 67 IN | WEIGHT: 291.2 LBS | RESPIRATION RATE: 16 BRPM | SYSTOLIC BLOOD PRESSURE: 134 MMHG | DIASTOLIC BLOOD PRESSURE: 78 MMHG | HEART RATE: 59 BPM | BODY MASS INDEX: 45.71 KG/M2 | OXYGEN SATURATION: 98 %

## 2025-08-15 DIAGNOSIS — R05.1 ACUTE COUGH: ICD-10-CM

## 2025-08-15 DIAGNOSIS — I10 HYPERTENSION, UNSPECIFIED TYPE: Primary | ICD-10-CM

## 2025-08-15 PROCEDURE — 99214 OFFICE O/P EST MOD 30 MIN: CPT | Performed by: NURSE PRACTITIONER

## 2025-08-15 PROCEDURE — 3075F SYST BP GE 130 - 139MM HG: CPT | Performed by: NURSE PRACTITIONER

## 2025-08-15 PROCEDURE — 3078F DIAST BP <80 MM HG: CPT | Performed by: NURSE PRACTITIONER

## 2025-08-15 ASSESSMENT — ENCOUNTER SYMPTOMS
BLOOD IN STOOL: 0
SINUS PAIN: 0
DIARRHEA: 0
SHORTNESS OF BREATH: 0
GASTROINTESTINAL NEGATIVE: 1
FACIAL SWELLING: 0
SORE THROAT: 0
COUGH: 0
WHEEZING: 0
COLOR CHANGE: 0
ABDOMINAL PAIN: 0
CONSTIPATION: 0
NAUSEA: 0
RESPIRATORY NEGATIVE: 1
VOMITING: 0
TROUBLE SWALLOWING: 0
RHINORRHEA: 0
ABDOMINAL DISTENTION: 0
CHEST TIGHTNESS: 0
SINUS PRESSURE: 0